# Patient Record
Sex: FEMALE
[De-identification: names, ages, dates, MRNs, and addresses within clinical notes are randomized per-mention and may not be internally consistent; named-entity substitution may affect disease eponyms.]

---

## 2017-11-20 ENCOUNTER — RESULT REVIEW (OUTPATIENT)
Age: 22
End: 2017-11-20

## 2017-12-06 ENCOUNTER — APPOINTMENT (OUTPATIENT)
Dept: ANTEPARTUM | Facility: CLINIC | Age: 22
End: 2017-12-06
Payer: MEDICAID

## 2017-12-06 ENCOUNTER — ASOB RESULT (OUTPATIENT)
Age: 22
End: 2017-12-06

## 2017-12-06 PROBLEM — Z00.00 ENCOUNTER FOR PREVENTIVE HEALTH EXAMINATION: Status: ACTIVE | Noted: 2017-12-06

## 2017-12-06 PROCEDURE — 76813 OB US NUCHAL MEAS 1 GEST: CPT

## 2017-12-06 PROCEDURE — 76801 OB US < 14 WKS SINGLE FETUS: CPT

## 2018-01-26 ENCOUNTER — APPOINTMENT (OUTPATIENT)
Dept: ANTEPARTUM | Facility: CLINIC | Age: 23
End: 2018-01-26

## 2018-02-07 ENCOUNTER — APPOINTMENT (OUTPATIENT)
Dept: ANTEPARTUM | Facility: CLINIC | Age: 23
End: 2018-02-07
Payer: MEDICAID

## 2018-02-07 ENCOUNTER — ASOB RESULT (OUTPATIENT)
Age: 23
End: 2018-02-07

## 2018-02-07 PROCEDURE — 76811 OB US DETAILED SNGL FETUS: CPT

## 2018-04-03 ENCOUNTER — ASOB RESULT (OUTPATIENT)
Age: 23
End: 2018-04-03

## 2018-04-03 ENCOUNTER — APPOINTMENT (OUTPATIENT)
Dept: ANTEPARTUM | Facility: CLINIC | Age: 23
End: 2018-04-03
Payer: MEDICAID

## 2018-04-03 PROCEDURE — 76816 OB US FOLLOW-UP PER FETUS: CPT

## 2018-05-31 ENCOUNTER — INPATIENT (INPATIENT)
Facility: HOSPITAL | Age: 23
LOS: 3 days | Discharge: HOME | End: 2018-06-04
Attending: OBSTETRICS & GYNECOLOGY | Admitting: OBSTETRICS & GYNECOLOGY

## 2018-05-31 VITALS — HEIGHT: 62 IN | WEIGHT: 134.48 LBS

## 2018-05-31 RX ORDER — CITRIC ACID/SODIUM CITRATE 300-500 MG
15 SOLUTION, ORAL ORAL EVERY 4 HOURS
Qty: 0 | Refills: 0 | Status: DISCONTINUED | OUTPATIENT
Start: 2018-05-31 | End: 2018-06-02

## 2018-05-31 RX ORDER — SODIUM CHLORIDE 9 MG/ML
500 INJECTION, SOLUTION INTRAVENOUS ONCE
Qty: 0 | Refills: 0 | Status: COMPLETED | OUTPATIENT
Start: 2018-05-31 | End: 2018-06-01

## 2018-05-31 RX ORDER — SODIUM CHLORIDE 9 MG/ML
1000 INJECTION, SOLUTION INTRAVENOUS
Qty: 0 | Refills: 0 | Status: DISCONTINUED | OUTPATIENT
Start: 2018-05-31 | End: 2018-06-02

## 2018-05-31 RX ORDER — OXYTOCIN 10 UNIT/ML
333.33 VIAL (ML) INJECTION
Qty: 20 | Refills: 0 | Status: COMPLETED | OUTPATIENT
Start: 2018-05-31

## 2018-05-31 NOTE — PATIENT PROFILE OB - CURRENT PREGNANCY COMPLICATIONS, OB PROFILE
Maternal Positive GBS/elevated liver enzymes cholystasis Maternal Positive GBS/Maternal Medical Condition/elevated liver enzymes cholystasis

## 2018-06-01 LAB
ABO RH CONFIRMATION: SIGNIFICANT CHANGE UP
ALBUMIN SERPL ELPH-MCNC: 2.4 G/DL — LOW (ref 3.3–5)
ALP SERPL-CCNC: 392 U/L — HIGH (ref 40–120)
ALT FLD-CCNC: 262 U/L — HIGH (ref 12–78)
APPEARANCE UR: CLEAR — SIGNIFICANT CHANGE UP
AST SERPL-CCNC: 126 U/L — HIGH (ref 15–37)
BACTERIA # UR AUTO: ABNORMAL
BASOPHILS # BLD AUTO: 0.04 K/UL — SIGNIFICANT CHANGE UP (ref 0–0.2)
BASOPHILS NFR BLD AUTO: 0.4 % — SIGNIFICANT CHANGE UP (ref 0–2)
BILIRUB DIRECT SERPL-MCNC: 0.2 MG/DL — SIGNIFICANT CHANGE UP (ref 0–0.2)
BILIRUB INDIRECT FLD-MCNC: 0.2 MG/DL — SIGNIFICANT CHANGE UP (ref 0.2–1)
BILIRUB SERPL-MCNC: 0.4 MG/DL — SIGNIFICANT CHANGE UP (ref 0.2–1.2)
BILIRUB UR-MCNC: NEGATIVE — SIGNIFICANT CHANGE UP
BLD GP AB SCN SERPL QL: SIGNIFICANT CHANGE UP
COLOR SPEC: YELLOW — SIGNIFICANT CHANGE UP
DIFF PNL FLD: NEGATIVE — SIGNIFICANT CHANGE UP
EOSINOPHIL # BLD AUTO: 0.1 K/UL — SIGNIFICANT CHANGE UP (ref 0–0.5)
EOSINOPHIL NFR BLD AUTO: 0.9 % — SIGNIFICANT CHANGE UP (ref 0–6)
EPI CELLS # UR: SIGNIFICANT CHANGE UP
GLUCOSE UR QL: NEGATIVE MG/DL — SIGNIFICANT CHANGE UP
HCT VFR BLD CALC: 34.5 % — SIGNIFICANT CHANGE UP (ref 34.5–45)
HGB BLD-MCNC: 11.6 G/DL — SIGNIFICANT CHANGE UP (ref 11.5–15.5)
IMM GRANULOCYTES NFR BLD AUTO: 1.9 % — HIGH (ref 0–1.5)
KETONES UR-MCNC: NEGATIVE — SIGNIFICANT CHANGE UP
LEUKOCYTE ESTERASE UR-ACNC: ABNORMAL
LYMPHOCYTES # BLD AUTO: 18.1 % — SIGNIFICANT CHANGE UP (ref 13–44)
LYMPHOCYTES # BLD AUTO: 2.03 K/UL — SIGNIFICANT CHANGE UP (ref 1–3.3)
MCHC RBC-ENTMCNC: 27.1 PG — SIGNIFICANT CHANGE UP (ref 27–34)
MCHC RBC-ENTMCNC: 33.6 GM/DL — SIGNIFICANT CHANGE UP (ref 32–36)
MCV RBC AUTO: 80.6 FL — SIGNIFICANT CHANGE UP (ref 80–100)
MONOCYTES # BLD AUTO: 0.67 K/UL — SIGNIFICANT CHANGE UP (ref 0–0.9)
MONOCYTES NFR BLD AUTO: 6 % — SIGNIFICANT CHANGE UP (ref 2–14)
NEUTROPHILS # BLD AUTO: 8.15 K/UL — HIGH (ref 1.8–7.4)
NEUTROPHILS NFR BLD AUTO: 72.7 % — SIGNIFICANT CHANGE UP (ref 43–77)
NITRITE UR-MCNC: NEGATIVE — SIGNIFICANT CHANGE UP
NRBC # BLD: 0 /100 WBCS — SIGNIFICANT CHANGE UP (ref 0–0)
PH UR: 7 — SIGNIFICANT CHANGE UP (ref 5–8)
PLATELET # BLD AUTO: 226 K/UL — SIGNIFICANT CHANGE UP (ref 150–400)
PROT SERPL-MCNC: 7.1 GM/DL — SIGNIFICANT CHANGE UP (ref 6–8.3)
PROT UR-MCNC: 15 MG/DL
RBC # BLD: 4.28 M/UL — SIGNIFICANT CHANGE UP (ref 3.8–5.2)
RBC # FLD: 14.5 % — SIGNIFICANT CHANGE UP (ref 10.3–14.5)
RBC CASTS # UR COMP ASSIST: ABNORMAL /HPF (ref 0–4)
SP GR SPEC: 1.01 — SIGNIFICANT CHANGE UP (ref 1.01–1.02)
T PALLIDUM AB TITR SER: NEGATIVE — SIGNIFICANT CHANGE UP
TYPE + AB SCN PNL BLD: SIGNIFICANT CHANGE UP
UROBILINOGEN FLD QL: NEGATIVE MG/DL — SIGNIFICANT CHANGE UP
WBC # BLD: 11.2 K/UL — HIGH (ref 3.8–10.5)
WBC # FLD AUTO: 11.2 K/UL — HIGH (ref 3.8–10.5)
WBC UR QL: ABNORMAL

## 2018-06-01 RX ORDER — AMPICILLIN TRIHYDRATE 250 MG
1 CAPSULE ORAL EVERY 4 HOURS
Qty: 0 | Refills: 0 | Status: DISCONTINUED | OUTPATIENT
Start: 2018-06-01 | End: 2018-06-02

## 2018-06-01 RX ORDER — OXYTOCIN 10 UNIT/ML
2 VIAL (ML) INJECTION
Qty: 30 | Refills: 0 | Status: DISCONTINUED | OUTPATIENT
Start: 2018-06-01 | End: 2018-06-04

## 2018-06-01 RX ORDER — AMPICILLIN TRIHYDRATE 250 MG
2 CAPSULE ORAL ONCE
Qty: 0 | Refills: 0 | Status: COMPLETED | OUTPATIENT
Start: 2018-06-01 | End: 2018-06-01

## 2018-06-01 RX ORDER — AMPICILLIN TRIHYDRATE 250 MG
CAPSULE ORAL
Qty: 0 | Refills: 0 | Status: DISCONTINUED | OUTPATIENT
Start: 2018-06-01 | End: 2018-06-02

## 2018-06-01 RX ORDER — BUTORPHANOL TARTRATE 2 MG/ML
2 INJECTION, SOLUTION INTRAMUSCULAR; INTRAVENOUS ONCE
Qty: 0 | Refills: 0 | Status: DISCONTINUED | OUTPATIENT
Start: 2018-06-01 | End: 2018-06-01

## 2018-06-01 RX ADMIN — Medication 108 GRAM(S): at 13:36

## 2018-06-01 RX ADMIN — BUTORPHANOL TARTRATE 2 MILLIGRAM(S): 2 INJECTION, SOLUTION INTRAMUSCULAR; INTRAVENOUS at 20:58

## 2018-06-01 RX ADMIN — SODIUM CHLORIDE 125 MILLILITER(S): 9 INJECTION, SOLUTION INTRAVENOUS at 06:48

## 2018-06-01 RX ADMIN — SODIUM CHLORIDE 1000 MILLILITER(S): 9 INJECTION, SOLUTION INTRAVENOUS at 23:50

## 2018-06-01 RX ADMIN — SODIUM CHLORIDE 125 MILLILITER(S): 9 INJECTION, SOLUTION INTRAVENOUS at 00:12

## 2018-06-01 RX ADMIN — Medication 116 GRAM(S): at 09:32

## 2018-06-01 RX ADMIN — Medication 108 GRAM(S): at 17:35

## 2018-06-01 RX ADMIN — Medication 2 MILLIUNIT(S)/MIN: at 15:50

## 2018-06-01 RX ADMIN — Medication 108 GRAM(S): at 21:15

## 2018-06-02 LAB
ALBUMIN SERPL ELPH-MCNC: 1.8 G/DL — LOW (ref 3.3–5)
ALP SERPL-CCNC: 303 U/L — HIGH (ref 40–120)
ALT FLD-CCNC: 258 U/L — HIGH (ref 12–78)
ANION GAP SERPL CALC-SCNC: 10 MMOL/L — SIGNIFICANT CHANGE UP (ref 5–17)
AST SERPL-CCNC: 147 U/L — HIGH (ref 15–37)
BASOPHILS # BLD AUTO: 0.07 K/UL — SIGNIFICANT CHANGE UP (ref 0–0.2)
BASOPHILS NFR BLD AUTO: 0.4 % — SIGNIFICANT CHANGE UP (ref 0–2)
BILIRUB SERPL-MCNC: 0.8 MG/DL — SIGNIFICANT CHANGE UP (ref 0.2–1.2)
BUN SERPL-MCNC: 5 MG/DL — LOW (ref 7–23)
CALCIUM SERPL-MCNC: 8 MG/DL — LOW (ref 8.5–10.1)
CHLORIDE SERPL-SCNC: 106 MMOL/L — SIGNIFICANT CHANGE UP (ref 96–108)
CO2 SERPL-SCNC: 23 MMOL/L — SIGNIFICANT CHANGE UP (ref 22–31)
CREAT SERPL-MCNC: 0.48 MG/DL — LOW (ref 0.5–1.3)
EOSINOPHIL # BLD AUTO: 0.09 K/UL — SIGNIFICANT CHANGE UP (ref 0–0.5)
EOSINOPHIL NFR BLD AUTO: 0.5 % — SIGNIFICANT CHANGE UP (ref 0–6)
GLUCOSE SERPL-MCNC: 85 MG/DL — SIGNIFICANT CHANGE UP (ref 70–99)
HCT VFR BLD CALC: 28.6 % — LOW (ref 34.5–45)
HGB BLD-MCNC: 9.6 G/DL — LOW (ref 11.5–15.5)
IMM GRANULOCYTES NFR BLD AUTO: 1.1 % — SIGNIFICANT CHANGE UP (ref 0–1.5)
LYMPHOCYTES # BLD AUTO: 16.7 % — SIGNIFICANT CHANGE UP (ref 13–44)
LYMPHOCYTES # BLD AUTO: 2.95 K/UL — SIGNIFICANT CHANGE UP (ref 1–3.3)
MCHC RBC-ENTMCNC: 27.4 PG — SIGNIFICANT CHANGE UP (ref 27–34)
MCHC RBC-ENTMCNC: 33.6 GM/DL — SIGNIFICANT CHANGE UP (ref 32–36)
MCV RBC AUTO: 81.5 FL — SIGNIFICANT CHANGE UP (ref 80–100)
MONOCYTES # BLD AUTO: 0.89 K/UL — SIGNIFICANT CHANGE UP (ref 0–0.9)
MONOCYTES NFR BLD AUTO: 5 % — SIGNIFICANT CHANGE UP (ref 2–14)
NEUTROPHILS # BLD AUTO: 13.45 K/UL — HIGH (ref 1.8–7.4)
NEUTROPHILS NFR BLD AUTO: 76.3 % — SIGNIFICANT CHANGE UP (ref 43–77)
NRBC # BLD: 0 /100 WBCS — SIGNIFICANT CHANGE UP (ref 0–0)
PLATELET # BLD AUTO: 206 K/UL — SIGNIFICANT CHANGE UP (ref 150–400)
POTASSIUM SERPL-MCNC: 3.5 MMOL/L — SIGNIFICANT CHANGE UP (ref 3.5–5.3)
POTASSIUM SERPL-SCNC: 3.5 MMOL/L — SIGNIFICANT CHANGE UP (ref 3.5–5.3)
PROT SERPL-MCNC: 5.5 GM/DL — LOW (ref 6–8.3)
RBC # BLD: 3.51 M/UL — LOW (ref 3.8–5.2)
RBC # FLD: 14.8 % — HIGH (ref 10.3–14.5)
SODIUM SERPL-SCNC: 139 MMOL/L — SIGNIFICANT CHANGE UP (ref 135–145)
WBC # BLD: 17.64 K/UL — HIGH (ref 3.8–10.5)
WBC # FLD AUTO: 17.64 K/UL — HIGH (ref 3.8–10.5)

## 2018-06-02 RX ORDER — DIPHENHYDRAMINE HCL 50 MG
25 CAPSULE ORAL EVERY 6 HOURS
Qty: 0 | Refills: 0 | Status: DISCONTINUED | OUTPATIENT
Start: 2018-06-02 | End: 2018-06-04

## 2018-06-02 RX ORDER — DIBUCAINE 1 %
1 OINTMENT (GRAM) RECTAL EVERY 4 HOURS
Qty: 0 | Refills: 0 | Status: DISCONTINUED | OUTPATIENT
Start: 2018-06-02 | End: 2018-06-04

## 2018-06-02 RX ORDER — HYDROCORTISONE 1 %
1 OINTMENT (GRAM) TOPICAL EVERY 4 HOURS
Qty: 0 | Refills: 0 | Status: DISCONTINUED | OUTPATIENT
Start: 2018-06-02 | End: 2018-06-04

## 2018-06-02 RX ORDER — GLYCERIN ADULT
1 SUPPOSITORY, RECTAL RECTAL AT BEDTIME
Qty: 0 | Refills: 0 | Status: DISCONTINUED | OUTPATIENT
Start: 2018-06-02 | End: 2018-06-04

## 2018-06-02 RX ORDER — OXYTOCIN 10 UNIT/ML
41.67 VIAL (ML) INJECTION
Qty: 20 | Refills: 0 | Status: DISCONTINUED | OUTPATIENT
Start: 2018-06-02 | End: 2018-06-04

## 2018-06-02 RX ORDER — OXYCODONE AND ACETAMINOPHEN 5; 325 MG/1; MG/1
2 TABLET ORAL EVERY 6 HOURS
Qty: 0 | Refills: 0 | Status: DISCONTINUED | OUTPATIENT
Start: 2018-06-02 | End: 2018-06-04

## 2018-06-02 RX ORDER — AER TRAVELER 0.5 G/1
1 SOLUTION RECTAL; TOPICAL EVERY 4 HOURS
Qty: 0 | Refills: 0 | Status: DISCONTINUED | OUTPATIENT
Start: 2018-06-02 | End: 2018-06-04

## 2018-06-02 RX ORDER — SIMETHICONE 80 MG/1
80 TABLET, CHEWABLE ORAL EVERY 6 HOURS
Qty: 0 | Refills: 0 | Status: DISCONTINUED | OUTPATIENT
Start: 2018-06-02 | End: 2018-06-04

## 2018-06-02 RX ORDER — OXYTOCIN 10 UNIT/ML
333.33 VIAL (ML) INJECTION
Qty: 20 | Refills: 0 | Status: COMPLETED | OUTPATIENT
Start: 2018-06-02 | End: 2018-06-02

## 2018-06-02 RX ORDER — SODIUM CHLORIDE 9 MG/ML
3 INJECTION INTRAMUSCULAR; INTRAVENOUS; SUBCUTANEOUS EVERY 8 HOURS
Qty: 0 | Refills: 0 | Status: DISCONTINUED | OUTPATIENT
Start: 2018-06-02 | End: 2018-06-02

## 2018-06-02 RX ORDER — IBUPROFEN 200 MG
600 TABLET ORAL EVERY 6 HOURS
Qty: 0 | Refills: 0 | Status: DISCONTINUED | OUTPATIENT
Start: 2018-06-02 | End: 2018-06-04

## 2018-06-02 RX ORDER — MAGNESIUM HYDROXIDE 400 MG/1
30 TABLET, CHEWABLE ORAL
Qty: 0 | Refills: 0 | Status: DISCONTINUED | OUTPATIENT
Start: 2018-06-02 | End: 2018-06-04

## 2018-06-02 RX ORDER — ACETAMINOPHEN 500 MG
650 TABLET ORAL EVERY 6 HOURS
Qty: 0 | Refills: 0 | Status: DISCONTINUED | OUTPATIENT
Start: 2018-06-02 | End: 2018-06-04

## 2018-06-02 RX ORDER — PRAMOXINE HYDROCHLORIDE 150 MG/15G
1 AEROSOL, FOAM RECTAL EVERY 4 HOURS
Qty: 0 | Refills: 0 | Status: DISCONTINUED | OUTPATIENT
Start: 2018-06-02 | End: 2018-06-04

## 2018-06-02 RX ORDER — DOCUSATE SODIUM 100 MG
100 CAPSULE ORAL
Qty: 0 | Refills: 0 | Status: DISCONTINUED | OUTPATIENT
Start: 2018-06-02 | End: 2018-06-04

## 2018-06-02 RX ORDER — LANOLIN
1 OINTMENT (GRAM) TOPICAL EVERY 6 HOURS
Qty: 0 | Refills: 0 | Status: DISCONTINUED | OUTPATIENT
Start: 2018-06-02 | End: 2018-06-04

## 2018-06-02 RX ORDER — TETANUS TOXOID, REDUCED DIPHTHERIA TOXOID AND ACELLULAR PERTUSSIS VACCINE, ADSORBED 5; 2.5; 8; 8; 2.5 [IU]/.5ML; [IU]/.5ML; UG/.5ML; UG/.5ML; UG/.5ML
0.5 SUSPENSION INTRAMUSCULAR ONCE
Qty: 0 | Refills: 0 | Status: DISCONTINUED | OUTPATIENT
Start: 2018-06-02 | End: 2018-06-04

## 2018-06-02 RX ADMIN — Medication 100 MILLIGRAM(S): at 07:46

## 2018-06-02 RX ADMIN — Medication 600 MILLIGRAM(S): at 18:43

## 2018-06-02 RX ADMIN — Medication 1 APPLICATION(S): at 20:32

## 2018-06-02 RX ADMIN — Medication 600 MILLIGRAM(S): at 06:51

## 2018-06-02 RX ADMIN — Medication 108 GRAM(S): at 01:30

## 2018-06-02 RX ADMIN — OXYCODONE AND ACETAMINOPHEN 1 TABLET(S): 5; 325 TABLET ORAL at 07:46

## 2018-06-02 RX ADMIN — Medication 600 MILLIGRAM(S): at 19:30

## 2018-06-02 RX ADMIN — OXYCODONE AND ACETAMINOPHEN 2 TABLET(S): 5; 325 TABLET ORAL at 08:44

## 2018-06-02 RX ADMIN — Medication 1000 MILLIUNIT(S)/MIN: at 04:59

## 2018-06-02 RX ADMIN — Medication 1 TABLET(S): at 13:01

## 2018-06-02 RX ADMIN — Medication 600 MILLIGRAM(S): at 07:21

## 2018-06-02 NOTE — PROGRESS NOTE ADULT - SUBJECTIVE AND OBJECTIVE BOX
Progress Note  PPD # 0  Pt without complaints  Vital Signs Last 24 Hrs  T(C): 37.1 (2018 07:50), Max: 37.7 (2018 05:51)  T(F): 98.7 (2018 07:50), Max: 99.8 (2018 05:51)  HR: 74 (2018 07:50) (74 - 109)  BP: 103/52 (2018 07:50) (100/55 - 114/68)  BP(mean): --  RR: 16 (2018 07:50) (15 - 18)  SpO2: 98% (2018 07:50) (98% - 98%)  abdomen soft, utx firm/NT   light blood  labs pending  A/P s/p  doing well. Check labs. Follow per routine.

## 2018-06-03 LAB
ALBUMIN SERPL ELPH-MCNC: 2.2 G/DL — LOW (ref 3.3–5)
ALP SERPL-CCNC: 272 U/L — HIGH (ref 40–120)
ALT FLD-CCNC: 236 U/L — HIGH (ref 12–78)
AST SERPL-CCNC: 122 U/L — HIGH (ref 15–37)
BILIRUB DIRECT SERPL-MCNC: 0.1 MG/DL — SIGNIFICANT CHANGE UP (ref 0–0.2)
BILIRUB INDIRECT FLD-MCNC: 0.3 MG/DL — SIGNIFICANT CHANGE UP (ref 0.2–1)
BILIRUB SERPL-MCNC: 0.4 MG/DL — SIGNIFICANT CHANGE UP (ref 0.2–1.2)
PROT SERPL-MCNC: 6 GM/DL — SIGNIFICANT CHANGE UP (ref 6–8.3)

## 2018-06-03 RX ADMIN — Medication 600 MILLIGRAM(S): at 01:10

## 2018-06-03 RX ADMIN — Medication 100 MILLIGRAM(S): at 08:48

## 2018-06-03 RX ADMIN — Medication 600 MILLIGRAM(S): at 16:55

## 2018-06-03 RX ADMIN — Medication 1 TABLET(S): at 11:57

## 2018-06-03 RX ADMIN — Medication 600 MILLIGRAM(S): at 02:00

## 2018-06-04 ENCOUNTER — TRANSCRIPTION ENCOUNTER (OUTPATIENT)
Age: 23
End: 2018-06-04

## 2018-06-04 VITALS
HEART RATE: 92 BPM | DIASTOLIC BLOOD PRESSURE: 83 MMHG | SYSTOLIC BLOOD PRESSURE: 115 MMHG | TEMPERATURE: 98 F | OXYGEN SATURATION: 99 % | RESPIRATION RATE: 16 BRPM

## 2018-06-04 RX ORDER — DOCUSATE SODIUM 100 MG
1 CAPSULE ORAL
Qty: 0 | Refills: 0 | DISCHARGE
Start: 2018-06-04

## 2018-06-04 RX ORDER — FERROUS SULFATE 325(65) MG
1 TABLET ORAL
Qty: 30 | Refills: 0
Start: 2018-06-04 | End: 2018-07-03

## 2018-06-04 RX ORDER — DOCUSATE SODIUM 100 MG
1 CAPSULE ORAL
Qty: 30 | Refills: 1
Start: 2018-06-04 | End: 2018-08-02

## 2018-06-04 RX ORDER — IBUPROFEN 200 MG
1 TABLET ORAL
Qty: 30 | Refills: 0
Start: 2018-06-04

## 2018-06-04 RX ADMIN — Medication 600 MILLIGRAM(S): at 06:45

## 2018-06-04 RX ADMIN — Medication 600 MILLIGRAM(S): at 08:21

## 2018-06-04 RX ADMIN — Medication 1 TABLET(S): at 12:01

## 2018-06-04 RX ADMIN — Medication 100 MILLIGRAM(S): at 12:01

## 2018-06-04 NOTE — DISCHARGE NOTE OB - CARE PLAN
Principal Discharge DX:	Vaginal delivery  Goal:	successful transition to postpartum period  Assessment and plan of treatment:	22y.o.  s/p  on 18  2nd degree laceration  Rh positive  Rubella Immune  No s/s of PPD  Performing ADLs without difficulty  - discharge to home today  - PP warning signs rv'd along with s/s of PPD  - continue PNVs daily  - F/U with Jose Luis in 4-6wks  Secondary Diagnosis:	Anemia due to other cause  Goal:	return to normal Hematocrit and Hemoglobin  Assessment and plan of treatment:	Hct 28.6, Hgb 9.6  tolerating ambulation without difficulty  - begin iron supplements daily  - Rx'd stool softener while taking iron  - increase dietary iron

## 2018-06-04 NOTE — DISCHARGE NOTE OB - PLAN OF CARE
successful transition to postpartum period 22y.o.  s/p  on 18  2nd degree laceration  Rh positive  Rubella Immune  No s/s of PPD  Performing ADLs without difficulty  - discharge to home today  - PP warning signs rv'd along with s/s of PPD  - continue PNVs daily  - F/U with Jose Luis in 4-6wks return to normal Hematocrit and Hemoglobin Hct 28.6, Hgb 9.6  tolerating ambulation without difficulty  - begin iron supplements daily  - Rx'd stool softener while taking iron  - increase dietary iron

## 2018-06-04 NOTE — DISCHARGE NOTE OB - AVOID PROLONGED STANDING
Upon Nutritional Assessment by the Registered Dietitian your patient was determined to meet criteria / has evidence of the following diagnosis/diagnoses:          [ ]  Mild Protein Calorie Malnutrition        [X]  Moderate Protein Calorie Malnutrition        [ ] Severe Protein Calorie Malnutrition        [ ] Unspecified Protein Calorie Malnutrition        [ ] Underweight / BMI <19        [ ] Morbid Obesity / BMI > 40      Findings as based on:  •  Comprehensive nutrition assessment and consultation  •  Calorie counts (nutrient intake analysis)  •  Food acceptance and intake status from observations by staff  •  Follow up  •  Patient education  •  Intervention secondary to interdisciplinary rounds  •   concerns      Treatment:    The following diet has been recommended:  Low sodium , Ensure Enlive 2 x day=750 calories, 40 grams protein (ordered as recommended 09-29)  Recommend multivitamin c minerals daily      PROVIDER Section:     By signing this assessment you are acknowledging and agree with the diagnosis/diagnoses assigned by the Registered Dietitian    Comments: Statement Selected

## 2018-06-04 NOTE — DISCHARGE NOTE OB - HOSPITAL COURSE
Pt admitted for IOL due to ICP w/elevated liver enzymes and pending bile acids.  Proceeded for  of viable baby boy on .  2nd degree perineal laceration repaired.  Pt transitioning well to postpartum period.  Ambulating and tolerating po without difficulty.  Breastfeeding without concern at this time.  Experienced acute blood loss anemia.  Will Rx iron supplements and stool softener with discharge.  Follow up 4-6wks postpartum @[facility].

## 2018-06-04 NOTE — DISCHARGE NOTE OB - ADDITIONAL INSTRUCTIONS
- discharge to home today  - Call Jose Luis for vaginal bleeding soaking a pad in less than 1hr continuously, temp >100.4, pain/redness in calf, lumps w/redness in breasts, any signs of symptoms of postpartum depression  - continue PNVs daily  - Begin iron supplements with stool softener daily  - F/U 4-6wks with Jose Luis for PP visit  - F/U liver enzymes 6-8wks postpartum

## 2018-06-04 NOTE — DISCHARGE NOTE OB - CARE PROVIDER_API CALL
Ely Lynn), Obstetrics and Gynecology  [address] [place] [address]  Phone: [phone]  Fax: [phone]

## 2018-06-04 NOTE — DISCHARGE NOTE OB - MEDICATION SUMMARY - MEDICATIONS TO TAKE
I will START or STAY ON the medications listed below when I get home from the hospital:     mg oral tablet  -- 1 tab(s) by mouth every 6 hours, As Needed -Moderate pain or cramping - for moderate pain   -- Indication: For vaginal delivery    Prenatal Multivitamins with Folic Acid 1 mg oral tablet  -- 1 tab(s) by mouth once a day  -- Indication: For vaginal delivery    FeroSul 325 mg (65 mg elemental iron) oral tablet  -- 1 tab(s) by mouth once a day   -- Check with your doctor before becoming pregnant.  Do not chew, break, or crush.  May discolor urine or feces.    -- Indication: For anemia    Colace 100 mg oral capsule  -- 1 cap(s) by mouth once a day   -- Medication should be taken with plenty of water.    -- Indication: For vaginal delivery    docusate sodium 100 mg oral capsule  -- 1 cap(s) by mouth 2 times a day, As needed, Stool Softening  -- Indication: For vaginal delivery

## 2018-06-04 NOTE — DISCHARGE NOTE OB - PATIENT PORTAL LINK FT
You can access the [facility] Patient Portal, offered by [facility], by registering with the following website: [email or web address]

## 2018-06-07 DIAGNOSIS — Z3A.38 38 WEEKS GESTATION OF PREGNANCY: ICD-10-CM

## 2018-06-07 DIAGNOSIS — D62 ACUTE POSTHEMORRHAGIC ANEMIA: ICD-10-CM

## 2018-06-07 DIAGNOSIS — K83.1 OBSTRUCTION OF BILE DUCT: ICD-10-CM

## 2018-11-21 ENCOUNTER — RESULT REVIEW (OUTPATIENT)
Age: 23
End: 2018-11-21

## 2019-04-08 NOTE — DISCHARGE NOTE OB - PHYSICIAN SECTION COMPLETE
-Patient with INR of 1.5. Unclear etiology but noted to have liver dysfxn. Low suspicion for malnutrition  - no overt bleeding Yes

## 2019-04-12 ENCOUNTER — APPOINTMENT (OUTPATIENT)
Dept: ULTRASOUND IMAGING | Facility: CLINIC | Age: 24
End: 2019-04-12

## 2019-04-12 ENCOUNTER — OUTPATIENT (OUTPATIENT)
Dept: OUTPATIENT SERVICES | Facility: HOSPITAL | Age: 24
LOS: 1 days | End: 2019-04-12
Payer: MEDICAID

## 2019-04-12 DIAGNOSIS — Z00.8 ENCOUNTER FOR OTHER GENERAL EXAMINATION: ICD-10-CM

## 2019-04-12 PROCEDURE — 76641 ULTRASOUND BREAST COMPLETE: CPT

## 2019-04-12 PROCEDURE — 76641 ULTRASOUND BREAST COMPLETE: CPT | Mod: 26,50

## 2021-08-27 NOTE — DISCHARGE NOTE OB - TEXT 10
The patient is a 29y Female complaining of shortness of breath. limit trips up and down stairs- no carrying baby if possible

## 2021-09-10 ENCOUNTER — APPOINTMENT (OUTPATIENT)
Age: 26
End: 2021-09-10
Payer: COMMERCIAL

## 2021-09-10 PROCEDURE — 0001A: CPT

## 2021-10-01 ENCOUNTER — APPOINTMENT (OUTPATIENT)
Age: 26
End: 2021-10-01
Payer: COMMERCIAL

## 2021-10-01 PROCEDURE — 0002A: CPT

## 2021-11-22 NOTE — PROGRESS NOTE ADULT - SUBJECTIVE AND OBJECTIVE BOX
PPD#1    Pt Seen and evaluated at bedside. + flatus, no BM, pain is well controlled. No PO yet, ordering breakfast this am. Minimal lochia  Denies headache, dizziness, chest pain, palpitations, shortness of breath, nausea, vomiting, heavy vaginal bleeding or perineal discomfort. Pt is breastfeeding, ambulating without assistance, voiding spontaneously, and tolerating diet.     Plan for the day discussed, understanding assessed via teach back method, all questions answered.    REVIEW OF SYSTEMS:  CONSTITUTIONAL: No weakness, fevers or chills  EYES/ENT: No visual changes;  No vertigo or throat pain   NECK: No pain or stiffness  RESPIRATORY: No cough, wheezing, hemoptysis; No shortness of breath  CARDIOVASCULAR: No chest pain or palpitations  GASTROINTESTINAL: +abdominal pain in the setting of . No nausea, vomiting, or hematemesis; No diarrhea or constipation. No melena or hematochezia.  GENITOURINARY: No dysuria, frequency or hematuria  NEUROLOGICAL: No numbness or weakness  SKIN: No itching, burning, rashes, or lesions   All other review of systems is negative unless indicated above    OBJECTIVE  Vital Signs Last 24 Hrs  T(C): 36.7 (2018 07:23), Max: 37.1 (2018 07:50)  T(F): 98 (2018 07:23), Max: 98.7 (2018 07:50)  HR: 70 (2018 07:23) (70 - 93)  BP: 93/58 (2018 07:23) (88/50 - 103/52)  RR: 16 (2018 07:23) (16 - 16)  SpO2: 100% (2018 07:23) (98% - 100%)  I&O's Summary    2018 07:01  -  2018 07:00  --------------------------------------------------------  IN: 0 mL / OUT: 900 mL / NET: -900 mL        PHYSICAL EXAM:  Constitutional: NAD, awake and alert, well-developed  HEENT: PERR, EOMI, Normal Hearing, MMM  Neck: Soft and supple, No LAD, No JVD  Respiratory: Breath sounds are clear bilaterally, No wheezing, rales or rhonchi  Cardiovascular: S1 and S2, regular rate and rhythm, no Murmurs, gallops or rubs  Abdomen: soft, appropriately  tender, firm fundus palpated below umbilicus, nondistended, no rebound or guarding.   Extremities: No peripheral edema  Vascular: 2+ peripheral pulses  Neurological: A/O x 3, no focal deficits  Musculoskeletal: 5/5 strength b/l upper and lower extremities  Skin: No rashes    LABS: all labs reviewed    Rubella Immune  HIV AB nonreactive  RPR negative  HepBsurface antigen negative     @ 23:49  Type + Screen: O POS               9.6    17.64 )-----------( 206      ( 2018 17:41 )             28.6                         11.6   11.20 )-----------( 226      ( 31 May 2018 23:49 )             34.5     06-    139  |  106  |  5<L>  ----------------------------<  85  3.5   |  23  |  0.48<L>    Ca    8.0<L>      2018 06:46    TPro  5.5<L>  /  Alb  1.8<L>  /  TBili  0.8  /  DBili  x   /  AST  147<H>  /  ALT  258<H>  /  AlkPhos  303<H>            ASSESSMENT    PPD# s/p /vacum assisted VD. Pt is stable, vitals and labs are stable. Pain is well controlled.    PLAN  - Routine postpartum care  - OOB and in chair today  - Encourage ambulation and po fluids  - Encourage breastfeeding  - D/c on PPD#2 if stable PPD#1 s/p , now     Pt Seen and evaluated at bedside. + flatus, +BM, pain is well controlled.  Denies headache, dizziness, chest pain, palpitations, shortness of breath, nausea, vomiting, heavy vaginal bleeding or perineal discomfort. Pt is breastfeeding, ambulating without assistance, voiding spontaneously, and tolerating diet. Requests circumcision.     Plan for the day discussed, understanding assessed via teach back method, all questions answered.    REVIEW OF SYSTEMS:  CONSTITUTIONAL: No weakness, fevers or chills  EYES/ENT: No visual changes;  No vertigo or throat pain   NECK: No pain or stiffness  RESPIRATORY: No cough, wheezing, hemoptysis; No shortness of breath  CARDIOVASCULAR: No chest pain or palpitations  GASTROINTESTINAL: +abdominal pain in the setting of . No nausea, vomiting, or hematemesis; No diarrhea or constipation. No melena or hematochezia.  GENITOURINARY: No dysuria, frequency or hematuria  NEUROLOGICAL: No numbness or weakness  SKIN: No itching, burning, rashes, or lesions   All other review of systems is negative unless indicated above    OBJECTIVE  Vital Signs Last 24 Hrs  T(C): 36.7 (2018 07:23), Max: 37.1 (2018 07:50)  T(F): 98 (2018 07:23), Max: 98.7 (2018 07:50)  HR: 70 (2018 07:23) (70 - 93)  BP: 93/58 (2018 07:23) (88/50 - 103/52)  RR: 16 (2018 07:23) (16 - 16)  SpO2: 100% (2018 07:23) (98% - 100%)  I&O's Summary    2018 07:01  -  2018 07:00  --------------------------------------------------------  IN: 0 mL / OUT: 900 mL / NET: -900 mL        PHYSICAL EXAM:  Constitutional: NAD, awake and alert, well-developed  HEENT: EOMI, Normal Hearing, MMM  Neck: Soft and supple  Respiratory: Breath sounds are clear bilaterally, No wheezing, rales or rhonchi  Cardiovascular: S1 and S2, regular rate and rhythm  Abdomen: soft, appropriately  tender, firm fundus palpated below umbilicus, nondistended, no rebound or guarding.   Extremities: No peripheral edema  Vascular: 2+ peripheral pulses  Neurological: A/O x 3  Skin: No rashes    LABS: all labs reviewed    Rubella Immune  HIV AB nonreactive  RPR negative  HepBsurface antigen negative     @ 23:49  Type + Screen: O POS               9.6    17.64 )-----------( 206      ( 2018 17:41 )             28.6                         11.6   11.20 )-----------( 226      ( 31 May 2018 23:49 )             34.5     06-    139  |  106  |  5<L>  ----------------------------<  85  3.5   |  23  |  0.48<L>    Ca    8.0<L>      2018 06:46    TPro  5.5<L>  /  Alb  1.8<L>  /  TBili  0.8  /  DBili  x   /  AST  147<H>  /  ALT  258<H>  /  AlkPhos  303<H>            ASSESSMENT   PPD#1 s/p , now . Pt is stable, vitals and labs are stable. Pain is well controlled.    PLAN  - Routine postpartum care  - OOB and in chair today  - Encouraged ambulation and po fluids  - Encouraged breastfeeding  - D/c on PPD#2 if stable Patient

## 2022-09-12 ENCOUNTER — RESULT REVIEW (OUTPATIENT)
Age: 27
End: 2022-09-12

## 2022-10-12 ENCOUNTER — APPOINTMENT (OUTPATIENT)
Dept: ANTEPARTUM | Facility: CLINIC | Age: 27
End: 2022-10-12

## 2022-10-12 ENCOUNTER — ASOB RESULT (OUTPATIENT)
Age: 27
End: 2022-10-12

## 2022-10-12 PROCEDURE — 76813 OB US NUCHAL MEAS 1 GEST: CPT

## 2022-10-12 PROCEDURE — 36415 COLL VENOUS BLD VENIPUNCTURE: CPT

## 2022-10-17 LAB
ADDITIONAL US: NORMAL
CRL SCAN TWIN B: NORMAL
CRL SCAN: NORMAL
CROWN RUMP LENGTH TWIN B: NORMAL
CROWN RUMP LENGTH: 52.8 MM
DIA MOM: 1.21
DIA VALUE: 345.8 PG/ML
DOWN SYNDROME AGE RISK: NORMAL
DOWN SYNDROME INTERPRETATION: NORMAL
DOWN SYNDROME SCREENING RISK: NORMAL
FIRST TRIMESTER SCREEN COMMENTS: NORMAL
FIRST TRIMESTER SCREEN NOTE: NORMAL
FIRST TRIMESTER SCREEN RESULTS: NORMAL
FIRST TRIMESTER SCREEN TEST RESULTS: NORMAL
GEST. AGE ON COLLECTION DATE: 11.7 WEEKS
HCG MOM: 1.17
HCG VALUE: 141.8 IU/ML
MATERNAL AGE AT EDD: 27.8 YR
NT MOM TWIN B: NORMAL
NT TWIN B: NORMAL
NUCHAL TRANSLUCENCY (NT): 1.1 MM
NUCHAL TRANSLUCENCY MOM: 0.86
NUMBER OF FETUSES: 1
PAPP-A MOM: 0.97
PAPP-A VALUE: 832.3 NG/ML
RACE: NORMAL
SONOGRAPHER ID#: NORMAL
TRISOMY 18 AGE RISK: NORMAL
TRISOMY 18 INTERPRETATION: NORMAL
TRISOMY 18 SCREENING RISK: NORMAL
WEIGHT AFP: 127 LBS

## 2022-11-16 ENCOUNTER — APPOINTMENT (OUTPATIENT)
Dept: ANTEPARTUM | Facility: CLINIC | Age: 27
End: 2022-11-16

## 2022-11-16 PROCEDURE — 36415 COLL VENOUS BLD VENIPUNCTURE: CPT

## 2022-11-21 LAB
ADDITIONAL US: NORMAL
AFP MOM: 1.15
AFP VALUE: 45.7 NG/ML
COLLECTED ON 2: NORMAL
COLLECTED ON: NORMAL
CRL SCAN TWIN B: NORMAL
CRL SCAN: NORMAL
CROWN RUMP LENGTH TWIN B: NORMAL
CROWN RUMP LENGTH: 52.8 MM
DIA MOM: 0.91
DIA VALUE: 156.5 PG/ML
DOWN SYNDROME AGE RISK: NORMAL
DOWN SYNDROME INTERPRETATION: NORMAL
DOWN SYNDROME SCREENING RISK: NORMAL
FIRST TRIMESTER SAMPLE: NORMAL
GEST. AGE ON COLLECTION DATE: 11.7 WEEKS
GESTATIONAL AGE: 16.7 WEEKS
HCG MOM: 1.33
HCG VALUE: 48 IU/ML
INSULIN DEP DIABETES: NO
MATERNAL AGE AT EDD: 27.8 YR
NT MOM TWIN B: NORMAL
NT TWIN B: NORMAL
NUCHAL TRANSLUCENCY (NT): 1.1 MM
NUCHAL TRANSLUCENCY MOM: 0.86
NUMBER OF FETUSES: 1
OPEN SPINA BIFIDA: NORMAL
OSB INTERPRETATION: NORMAL
PAPP-A MOM: 0.97
PAPP-A VALUE: 832.3 NG/ML
RACE: NORMAL
SECOND TRIMESTER SAMPLE: NORMAL
SEQUENTIAL 2 COMMENTS: NORMAL
SEQUENTIAL 2 NOTE: NORMAL
SEQUENTIAL 2 RESULTS: NORMAL
SEQUENTIAL 2 TEST RESULTS: NORMAL
SONOGRAPHER ID#: NORMAL
TRISOMY 18 AGE RISK: NORMAL
TRISOMY 18 INTERPRETATION: NORMAL
TRISOMY 18 SCREENING RISK: NORMAL
UE3 MOM: 0.97
UE3 VALUE: 1.11 NG/ML
WEIGHT AFP: 127 LBS
WEIGHT: 129 LBS

## 2022-12-09 ENCOUNTER — APPOINTMENT (OUTPATIENT)
Dept: ANTEPARTUM | Facility: CLINIC | Age: 27
End: 2022-12-09

## 2022-12-09 ENCOUNTER — ASOB RESULT (OUTPATIENT)
Age: 27
End: 2022-12-09

## 2022-12-09 PROCEDURE — 76805 OB US >/= 14 WKS SNGL FETUS: CPT

## 2023-03-03 ENCOUNTER — ASOB RESULT (OUTPATIENT)
Age: 28
End: 2023-03-03

## 2023-03-03 ENCOUNTER — APPOINTMENT (OUTPATIENT)
Dept: ANTEPARTUM | Facility: CLINIC | Age: 28
End: 2023-03-03
Payer: MEDICAID

## 2023-03-03 PROCEDURE — 76816 OB US FOLLOW-UP PER FETUS: CPT

## 2023-03-03 PROCEDURE — 76819 FETAL BIOPHYS PROFIL W/O NST: CPT

## 2023-03-24 ENCOUNTER — NON-APPOINTMENT (OUTPATIENT)
Age: 28
End: 2023-03-24

## 2023-03-28 ENCOUNTER — APPOINTMENT (OUTPATIENT)
Dept: MATERNAL FETAL MEDICINE | Facility: CLINIC | Age: 28
End: 2023-03-28
Payer: MEDICAID

## 2023-03-28 ENCOUNTER — APPOINTMENT (OUTPATIENT)
Dept: ANTEPARTUM | Facility: CLINIC | Age: 28
End: 2023-03-28
Payer: MEDICAID

## 2023-03-28 ENCOUNTER — INPATIENT (INPATIENT)
Facility: HOSPITAL | Age: 28
LOS: 3 days | Discharge: HOME | DRG: 560 | End: 2023-04-01
Attending: OBSTETRICS & GYNECOLOGY | Admitting: OBSTETRICS & GYNECOLOGY
Payer: MEDICAID

## 2023-03-28 ENCOUNTER — ASOB RESULT (OUTPATIENT)
Age: 28
End: 2023-03-28

## 2023-03-28 ENCOUNTER — NON-APPOINTMENT (OUTPATIENT)
Age: 28
End: 2023-03-28

## 2023-03-28 VITALS
HEIGHT: 63 IN | WEIGHT: 141 LBS | OXYGEN SATURATION: 98 % | SYSTOLIC BLOOD PRESSURE: 98 MMHG | DIASTOLIC BLOOD PRESSURE: 58 MMHG | HEART RATE: 78 BPM | RESPIRATION RATE: 18 BRPM | BODY MASS INDEX: 24.98 KG/M2

## 2023-03-28 VITALS — HEIGHT: 63 IN

## 2023-03-28 DIAGNOSIS — K83.1 LIVER AND BILIARY TRACT DISORDERS IN PREGNANCY, THIRD TRIMESTER: ICD-10-CM

## 2023-03-28 DIAGNOSIS — Z87.59 PERSONAL HISTORY OF OTHER COMPLICATIONS OF PREGNANCY, CHILDBIRTH AND THE PUERPERIUM: ICD-10-CM

## 2023-03-28 DIAGNOSIS — Z3A.35 35 WEEKS GESTATION OF PREGNANCY: ICD-10-CM

## 2023-03-28 DIAGNOSIS — Z3A.00 WEEKS OF GESTATION OF PREGNANCY NOT SPECIFIED: ICD-10-CM

## 2023-03-28 DIAGNOSIS — Z34.90 ENCOUNTER FOR SUPERVISION OF NORMAL PREGNANCY, UNSPECIFIED, UNSPECIFIED TRIMESTER: ICD-10-CM

## 2023-03-28 DIAGNOSIS — Z29.9 ENCOUNTER FOR PROPHYLACTIC MEASURES, UNSPECIFIED: ICD-10-CM

## 2023-03-28 DIAGNOSIS — O26.619 LIVER AND BILIARY TRACT DISORDERS IN PREGNANCY, UNSPECIFIED TRIMESTER: ICD-10-CM

## 2023-03-28 DIAGNOSIS — O26.899 OTHER SPECIFIED PREGNANCY RELATED CONDITIONS, UNSPECIFIED TRIMESTER: ICD-10-CM

## 2023-03-28 DIAGNOSIS — R74.01 ELEVATION OF LEVELS OF LIVER TRANSAMINASE LEVELS: ICD-10-CM

## 2023-03-28 DIAGNOSIS — K83.1 LIVER AND BILIARY TRACT DISORDERS IN PREGNANCY, UNSPECIFIED TRIMESTER: ICD-10-CM

## 2023-03-28 DIAGNOSIS — O26.613 LIVER AND BILIARY TRACT DISORDERS IN PREGNANCY, THIRD TRIMESTER: ICD-10-CM

## 2023-03-28 LAB
ALBUMIN SERPL ELPH-MCNC: 2.3 G/DL — LOW (ref 3.3–5)
ALP SERPL-CCNC: 427 U/L — HIGH (ref 40–120)
ALT FLD-CCNC: 752 U/L — HIGH (ref 12–78)
AMYLASE P1 CFR SERPL: 50 U/L — SIGNIFICANT CHANGE UP (ref 25–115)
ANION GAP SERPL CALC-SCNC: 8 MMOL/L — SIGNIFICANT CHANGE UP (ref 5–17)
APPEARANCE UR: CLEAR — SIGNIFICANT CHANGE UP
APTT BLD: 26.4 SEC — LOW (ref 27.5–35.5)
AST SERPL-CCNC: 520 U/L — HIGH (ref 15–37)
BASOPHILS # BLD AUTO: 0.05 K/UL — SIGNIFICANT CHANGE UP (ref 0–0.2)
BASOPHILS NFR BLD AUTO: 0.6 % — SIGNIFICANT CHANGE UP (ref 0–2)
BILIRUB SERPL-MCNC: 0.6 MG/DL — SIGNIFICANT CHANGE UP (ref 0.2–1.2)
BILIRUB UR-MCNC: NEGATIVE — SIGNIFICANT CHANGE UP
BUN SERPL-MCNC: 9 MG/DL — SIGNIFICANT CHANGE UP (ref 7–23)
CALCIUM SERPL-MCNC: 8.9 MG/DL — SIGNIFICANT CHANGE UP (ref 8.5–10.1)
CHLORIDE SERPL-SCNC: 110 MMOL/L — HIGH (ref 96–108)
CO2 SERPL-SCNC: 21 MMOL/L — LOW (ref 22–31)
COLOR SPEC: YELLOW — SIGNIFICANT CHANGE UP
CREAT ?TM UR-MCNC: 58 MG/DL — SIGNIFICANT CHANGE UP
CREAT SERPL-MCNC: 0.53 MG/DL — SIGNIFICANT CHANGE UP (ref 0.5–1.3)
DIFF PNL FLD: ABNORMAL
EGFR: 130 ML/MIN/1.73M2 — SIGNIFICANT CHANGE UP
EOSINOPHIL # BLD AUTO: 0.06 K/UL — SIGNIFICANT CHANGE UP (ref 0–0.5)
EOSINOPHIL NFR BLD AUTO: 0.7 % — SIGNIFICANT CHANGE UP (ref 0–6)
FIBRINOGEN PPP-MCNC: 1072 MG/DL — HIGH (ref 330–520)
GLUCOSE SERPL-MCNC: 83 MG/DL — SIGNIFICANT CHANGE UP (ref 70–99)
GLUCOSE UR QL: NEGATIVE — SIGNIFICANT CHANGE UP
HCT VFR BLD CALC: 36.4 % — SIGNIFICANT CHANGE UP (ref 34.5–45)
HGB BLD-MCNC: 11.9 G/DL — SIGNIFICANT CHANGE UP (ref 11.5–15.5)
IMM GRANULOCYTES NFR BLD AUTO: 0.9 % — SIGNIFICANT CHANGE UP (ref 0–0.9)
INR BLD: 0.87 RATIO — LOW (ref 0.88–1.16)
KETONES UR-MCNC: NEGATIVE — SIGNIFICANT CHANGE UP
LDH SERPL L TO P-CCNC: 273 U/L — HIGH (ref 84–241)
LEUKOCYTE ESTERASE UR-ACNC: NEGATIVE — SIGNIFICANT CHANGE UP
LIDOCAIN IGE QN: 137 U/L — SIGNIFICANT CHANGE UP (ref 73–393)
LYMPHOCYTES # BLD AUTO: 1.88 K/UL — SIGNIFICANT CHANGE UP (ref 1–3.3)
LYMPHOCYTES # BLD AUTO: 23.5 % — SIGNIFICANT CHANGE UP (ref 13–44)
MCHC RBC-ENTMCNC: 26.6 PG — LOW (ref 27–34)
MCHC RBC-ENTMCNC: 32.7 GM/DL — SIGNIFICANT CHANGE UP (ref 32–36)
MCV RBC AUTO: 81.4 FL — SIGNIFICANT CHANGE UP (ref 80–100)
MONOCYTES # BLD AUTO: 0.38 K/UL — SIGNIFICANT CHANGE UP (ref 0–0.9)
MONOCYTES NFR BLD AUTO: 4.7 % — SIGNIFICANT CHANGE UP (ref 2–14)
NEUTROPHILS # BLD AUTO: 5.57 K/UL — SIGNIFICANT CHANGE UP (ref 1.8–7.4)
NEUTROPHILS NFR BLD AUTO: 69.6 % — SIGNIFICANT CHANGE UP (ref 43–77)
NITRITE UR-MCNC: NEGATIVE — SIGNIFICANT CHANGE UP
PH UR: 6.5 — SIGNIFICANT CHANGE UP (ref 5–8)
PLATELET # BLD AUTO: 168 K/UL — SIGNIFICANT CHANGE UP (ref 150–400)
POTASSIUM SERPL-MCNC: 3.7 MMOL/L — SIGNIFICANT CHANGE UP (ref 3.5–5.3)
POTASSIUM SERPL-SCNC: 3.7 MMOL/L — SIGNIFICANT CHANGE UP (ref 3.5–5.3)
PROT ?TM UR-MCNC: 23 MG/DL — HIGH (ref 0–12)
PROT SERPL-MCNC: 7.8 GM/DL — SIGNIFICANT CHANGE UP (ref 6–8.3)
PROT UR-MCNC: 15
PROT/CREAT UR-RTO: 0.4 RATIO — HIGH (ref 0–0.2)
PROTHROM AB SERPL-ACNC: 10.1 SEC — LOW (ref 10.5–13.4)
RBC # BLD: 4.47 M/UL — SIGNIFICANT CHANGE UP (ref 3.8–5.2)
RBC # FLD: 14.3 % — SIGNIFICANT CHANGE UP (ref 10.3–14.5)
SARS-COV-2 RNA SPEC QL NAA+PROBE: SIGNIFICANT CHANGE UP
SODIUM SERPL-SCNC: 139 MMOL/L — SIGNIFICANT CHANGE UP (ref 135–145)
SP GR SPEC: 1.01 — SIGNIFICANT CHANGE UP (ref 1.01–1.02)
URATE SERPL-MCNC: 4.1 MG/DL — SIGNIFICANT CHANGE UP (ref 2.5–7)
UROBILINOGEN FLD QL: NEGATIVE — SIGNIFICANT CHANGE UP
WBC # BLD: 8.01 K/UL — SIGNIFICANT CHANGE UP (ref 3.8–10.5)
WBC # FLD AUTO: 8.01 K/UL — SIGNIFICANT CHANGE UP (ref 3.8–10.5)

## 2023-03-28 PROCEDURE — 87635 SARS-COV-2 COVID-19 AMP PRB: CPT

## 2023-03-28 PROCEDURE — 88307 TISSUE EXAM BY PATHOLOGIST: CPT

## 2023-03-28 PROCEDURE — 76700 US EXAM ABDOM COMPLETE: CPT

## 2023-03-28 PROCEDURE — 86850 RBC ANTIBODY SCREEN: CPT

## 2023-03-28 PROCEDURE — 86780 TREPONEMA PALLIDUM: CPT

## 2023-03-28 PROCEDURE — 99214 OFFICE O/P EST MOD 30 MIN: CPT

## 2023-03-28 PROCEDURE — 76816 OB US FOLLOW-UP PER FETUS: CPT

## 2023-03-28 PROCEDURE — 76857 US EXAM PELVIC LIMITED: CPT

## 2023-03-28 PROCEDURE — 87086 URINE CULTURE/COLONY COUNT: CPT

## 2023-03-28 PROCEDURE — 94760 N-INVAS EAR/PLS OXIMETRY 1: CPT

## 2023-03-28 PROCEDURE — 76700 US EXAM ABDOM COMPLETE: CPT | Mod: 26

## 2023-03-28 PROCEDURE — 86900 BLOOD TYPING SEROLOGIC ABO: CPT

## 2023-03-28 PROCEDURE — 87522 HEPATITIS C REVRS TRNSCRPJ: CPT

## 2023-03-28 PROCEDURE — ZZZZZ: CPT

## 2023-03-28 PROCEDURE — 84156 ASSAY OF PROTEIN URINE: CPT

## 2023-03-28 PROCEDURE — 86769 SARS-COV-2 COVID-19 ANTIBODY: CPT

## 2023-03-28 PROCEDURE — 85730 THROMBOPLASTIN TIME PARTIAL: CPT

## 2023-03-28 PROCEDURE — 86901 BLOOD TYPING SEROLOGIC RH(D): CPT

## 2023-03-28 PROCEDURE — 87340 HEPATITIS B SURFACE AG IA: CPT

## 2023-03-28 PROCEDURE — 76818 FETAL BIOPHYS PROFILE W/NST: CPT

## 2023-03-28 PROCEDURE — 85027 COMPLETE CBC AUTOMATED: CPT

## 2023-03-28 PROCEDURE — 76857 US EXAM PELVIC LIMITED: CPT | Mod: 26

## 2023-03-28 PROCEDURE — 87521 HEPATITIS C PROBE&RVRS TRNSC: CPT

## 2023-03-28 PROCEDURE — 83690 ASSAY OF LIPASE: CPT

## 2023-03-28 PROCEDURE — 81001 URINALYSIS AUTO W/SCOPE: CPT

## 2023-03-28 PROCEDURE — 84550 ASSAY OF BLOOD/URIC ACID: CPT

## 2023-03-28 PROCEDURE — 99204 OFFICE O/P NEW MOD 45 MIN: CPT | Mod: TH

## 2023-03-28 PROCEDURE — 83615 LACTATE (LD) (LDH) ENZYME: CPT

## 2023-03-28 PROCEDURE — 36415 COLL VENOUS BLD VENIPUNCTURE: CPT

## 2023-03-28 PROCEDURE — 85610 PROTHROMBIN TIME: CPT

## 2023-03-28 PROCEDURE — 85384 FIBRINOGEN ACTIVITY: CPT

## 2023-03-28 PROCEDURE — 59050 FETAL MONITOR W/REPORT: CPT

## 2023-03-28 PROCEDURE — 82150 ASSAY OF AMYLASE: CPT

## 2023-03-28 PROCEDURE — 85025 COMPLETE CBC W/AUTO DIFF WBC: CPT

## 2023-03-28 PROCEDURE — 80053 COMPREHEN METABOLIC PANEL: CPT

## 2023-03-28 PROCEDURE — C1889: CPT

## 2023-03-28 PROCEDURE — 82570 ASSAY OF URINE CREATININE: CPT

## 2023-03-28 RX ORDER — FERROUS SULFATE 325(65) MG
325 (65 FE) TABLET ORAL
Refills: 0 | Status: ACTIVE | COMMUNITY

## 2023-03-28 RX ORDER — URSODIOL 250 MG/1
300 TABLET, FILM COATED ORAL EVERY 12 HOURS
Refills: 0 | Status: DISCONTINUED | OUTPATIENT
Start: 2023-03-28 | End: 2023-03-30

## 2023-03-28 RX ORDER — VITAMIN C, CALCIUM, IRON, VITAMIN D3, VITAMIN E, VITAMIN B1, VITAMIN B2, VITAMIN B3, VITAMIN B6, FOLIC ACID, IODINE, ZINC, COPPER, DOCUSATE SODIUM, DOCOSAHEXAENOIC ACID (DHA) 27-1-50 MG
KIT ORAL
Refills: 0 | Status: ACTIVE | COMMUNITY

## 2023-03-28 RX ORDER — FOLIC ACID 0.8 MG
1 TABLET ORAL DAILY
Refills: 0 | Status: DISCONTINUED | OUTPATIENT
Start: 2023-03-28 | End: 2023-04-01

## 2023-03-28 RX ORDER — FOLIC ACID 0.8 MG
1 TABLET ORAL DAILY
Refills: 0 | Status: DISCONTINUED | OUTPATIENT
Start: 2023-03-28 | End: 2023-03-28

## 2023-03-28 RX ORDER — URSODIOL 300 MG/1
300 CAPSULE ORAL
Refills: 0 | Status: ACTIVE | COMMUNITY

## 2023-03-28 RX ADMIN — URSODIOL 300 MILLIGRAM(S): 250 TABLET, FILM COATED ORAL at 23:10

## 2023-03-28 RX ADMIN — Medication 12 MILLIGRAM(S): at 21:52

## 2023-03-28 NOTE — PATIENT PROFILE OB - BABY A: ELECT TRANSPOND NUMBER, DELIVERY
For medication: meloxicam (MOBIC) 7.5 MG tablet  (medication not loading on list)    90 Day supply requested? No    Comments: in script bin     Pharmacy loaded below: Yes   [id number]

## 2023-03-28 NOTE — DISCUSSION/SUMMARY
[FreeTextEntry1] : Twice weekly testing until delivery\par \par Fetal kick counts encouraged. \par \par Delivery at 36 weeks

## 2023-03-28 NOTE — CONSULT NOTE ADULT - PROBLEM SELECTOR RECOMMENDATION 3
- Intrahepatic cholestasis in this pregnancy - 3/21 - Bile acids noted to be 120; AST/ALT: 272/422  - Repeat CBC, CMP, coags to be ordered  - Urine P/C ration  - Recommend RUQ U/S to evaluate for any other liver/gall bladder pathology.

## 2023-03-28 NOTE — HISTORY OF PRESENT ILLNESS
[FreeTextEntry1] : Rosamaria presents for evaluation of lab work, generalized pruritis and a hisotry of cholestasis on her prior pregnancy

## 2023-03-28 NOTE — CONSULT NOTE ADULT - PROBLEM SELECTOR RECOMMENDATION 9
- 35.5 weeks GA by LMP (MICHELLE: 23, LMP: 22)   - NST reactive; [place] ctx q10-15; Patient with reassuring tracing for greater than 1 hour. Patient can transition to NST BID for 1 hour.   - continue PNV  - Regular diet   - Neonatology consult recommended due to  gestational age.   - Prenatal labs collected  - GBS positive; will need Ampicillin in labor.

## 2023-03-28 NOTE — CONSULT NOTE ADULT - PROBLEM SELECTOR RECOMMENDATION 4
- Bedside sono showing vertex fetus; anterior placenta  - When delivery indicated, can proceed with IOL for vaginal delivery.

## 2023-03-28 NOTE — DATA REVIEWED
[FreeTextEntry1] : Fetal testing today is reassuring. \par \par Review of labwork shows a markedly elevated Bile acids, as well as elevated LFTs. \par \par Her pruritis has improved slightly with the ursodiol, but continues to be uncomfortable. \par \par She is given the diagnosis of cholestasis, and delivery at 36 weeks is recommended.

## 2023-03-28 NOTE — CONSULT NOTE ADULT - ASSESSMENT
27y  at 35.5 weeks GA by LMP (MICHELLE: 23, LMP: 22) who presents to L&D with back and hip pain; presently not found to be in labor. Evaluation to include pyelonephritis vs UTI vs kidney stone per OB team.   MFM consulted due to known diagnosis of IHCP with worsening AST/ALT in terms of management and delivery time.

## 2023-03-28 NOTE — CONSULT NOTE ADULT - SUBJECTIVE AND OBJECTIVE BOX
Interpretor ID# [id number]    27y  at 35.5 weeks GA by LMP who presents to L&D for hip pain and back pain.  Patient states intermittent ctx/cramping. Patient denies vaginal bleeding and leakage of fluid. She endorses good fetal movement. Denies fevers, chills, nausea, vomiting, chest pain, dizziness and headache. Patient also endorses frequent pruritius all over but especially on hands and feet.     MICHELLE: 23  LMP: 22    Prenatal course is significant for:  - Intrahepatic cholestasis in this pregnancy - 3/21 - Bile acids noted to be 120; AST/ALT: 272/422; Delivery reccomended at 36 weeks as per M note.     POB:  G1 - 2018 -  @ 38 wks; IOL due to choelstasis   PGYN: -fibroids, -ovarian cysts, denies STD hx, denies abnormal PAPs   PMH: Denies  PSH: Denies  SH: Denies EtOH, tobacco and illicit drug use during this pregnancy; feels safe at home   Meds: PNVs; iron, Ursodiol 300 BID  Allergies: NKDA    Sono 3/28/23: Vertex, anterior placenta; EFW 2664g, 40%      Vital Signs:  Vital Signs Last 24 Hrs  T(C): 36.4 (28 Mar 2023 22:35), Max: 36.4 (28 Mar 2023 22:35)  T(F): 97.5 (28 Mar 2023 22:35), Max: 97.5 (28 Mar 2023 22:35)  HR: 89 (28 Mar 2023 22:35) (89 - 89)  BP: 107/62 (28 Mar 2023 22:35) (107/62 - 107/62)  BP(mean): --  RR: 18 (28 Mar 2023 22:35) (18 - 18)  SpO2: 98% (28 Mar 2023 22:35) (98% - 98%)    Parameters below as of 28 Mar 2023 22:35  Patient On (Oxygen Delivery Method): room air    Gen: NAD, well-appearing, AAOx3   CV: RRR  Pulm: CTAB  Abd: Soft, gravid; nontender to palpation; mild tenderness suprapubic   Back: Left CVA tenderness  Ext: non-tender, non-edematous  SSE: physiologic discharge, no vaginal bleeding, no pooling of fluid  SVE:  60/-3     Bedside sono: vertex, anterior placenta   FHT: 150 bpm, moderate variability, +Accels, -deccels   [place]:  q10 - 15          Labs:                          11.9   8.01  )-----------( 168      ( 28 Mar 2023 20:29 )             36.4         139  |  110<H>  |  9   ----------------------------<  83  3.7   |  21<L>  |  0.53    Ca    8.9      28 Mar 2023 20:29    TPro  7.8  /  Alb  2.3<L>  /  TBili  0.6  /  DBili  x   /  AST  520<H>  /  ALT  752<H>  /  AlkPhos  427<H>      PT/INR - ( 28 Mar 2023 20:29 )   PT: 10.1 sec;   INR: 0.87 ratio         PTT - ( 28 Mar 2023 20:29 )  PTT:26.4 sec            MEDICATIONS  (STANDING):  betamethasone Injectable 12 milliGRAM(s) IntraMuscular every 24 hours  folic acid 1 milliGRAM(s) Oral daily  prenatal multivitamin 1 Tablet(s) Oral daily  ursodiol Capsule 300 milliGRAM(s) Oral every 12 hours    MEDICATIONS  (PRN):

## 2023-03-28 NOTE — CONSULT NOTE ADULT - PROBLEM SELECTOR RECOMMENDATION 2
- Intrahepatic cholestasis in this pregnancy - 3/21 - Bile acids noted to be 120; AST/ALT: 272/422  - Delivery recommended at 36 weeks   - Repeat labs to be ordered including CBC, CMP, coags   - Patient to receive Betamethasone 12mg q24 hrs x2 doses for ACS  - Continue Ursodiol 300 BID while on antepartum service

## 2023-03-29 LAB
ALBUMIN SERPL ELPH-MCNC: 2.2 G/DL — LOW (ref 3.3–5)
ALP SERPL-CCNC: 405 U/L — HIGH (ref 40–120)
ALT FLD-CCNC: 783 U/L — HIGH (ref 12–78)
ANION GAP SERPL CALC-SCNC: 7 MMOL/L — SIGNIFICANT CHANGE UP (ref 5–17)
APTT BLD: 26.3 SEC — LOW (ref 27.5–35.5)
AST SERPL-CCNC: 528 U/L — HIGH (ref 15–37)
BASOPHILS # BLD AUTO: 0.02 K/UL — SIGNIFICANT CHANGE UP (ref 0–0.2)
BASOPHILS NFR BLD AUTO: 0.2 % — SIGNIFICANT CHANGE UP (ref 0–2)
BILIRUB SERPL-MCNC: 0.6 MG/DL — SIGNIFICANT CHANGE UP (ref 0.2–1.2)
BUN SERPL-MCNC: 13 MG/DL — SIGNIFICANT CHANGE UP (ref 7–23)
CALCIUM SERPL-MCNC: 9.1 MG/DL — SIGNIFICANT CHANGE UP (ref 8.5–10.1)
CHLORIDE SERPL-SCNC: 108 MMOL/L — SIGNIFICANT CHANGE UP (ref 96–108)
CO2 SERPL-SCNC: 21 MMOL/L — LOW (ref 22–31)
COVID-19 SPIKE DOMAIN AB INTERP: POSITIVE
COVID-19 SPIKE DOMAIN ANTIBODY RESULT: >250 U/ML — HIGH
CREAT SERPL-MCNC: 0.51 MG/DL — SIGNIFICANT CHANGE UP (ref 0.5–1.3)
CULTURE RESULTS: SIGNIFICANT CHANGE UP
EGFR: 131 ML/MIN/1.73M2 — SIGNIFICANT CHANGE UP
EOSINOPHIL # BLD AUTO: 0 K/UL — SIGNIFICANT CHANGE UP (ref 0–0.5)
EOSINOPHIL NFR BLD AUTO: 0 % — SIGNIFICANT CHANGE UP (ref 0–6)
FIBRINOGEN PPP-MCNC: 974 MG/DL — HIGH (ref 330–520)
GLUCOSE SERPL-MCNC: 95 MG/DL — SIGNIFICANT CHANGE UP (ref 70–99)
HBV SURFACE AG SERPL QL IA: SIGNIFICANT CHANGE UP
HCT VFR BLD CALC: 33.7 % — LOW (ref 34.5–45)
HGB BLD-MCNC: 11 G/DL — LOW (ref 11.5–15.5)
IMM GRANULOCYTES NFR BLD AUTO: 0.9 % — SIGNIFICANT CHANGE UP (ref 0–0.9)
INR BLD: 0.92 RATIO — SIGNIFICANT CHANGE UP (ref 0.88–1.16)
LYMPHOCYTES # BLD AUTO: 1.38 K/UL — SIGNIFICANT CHANGE UP (ref 1–3.3)
LYMPHOCYTES # BLD AUTO: 17 % — SIGNIFICANT CHANGE UP (ref 13–44)
MCHC RBC-ENTMCNC: 26.8 PG — LOW (ref 27–34)
MCHC RBC-ENTMCNC: 32.6 GM/DL — SIGNIFICANT CHANGE UP (ref 32–36)
MCV RBC AUTO: 82.2 FL — SIGNIFICANT CHANGE UP (ref 80–100)
MONOCYTES # BLD AUTO: 0.16 K/UL — SIGNIFICANT CHANGE UP (ref 0–0.9)
MONOCYTES NFR BLD AUTO: 2 % — SIGNIFICANT CHANGE UP (ref 2–14)
NEUTROPHILS # BLD AUTO: 6.49 K/UL — SIGNIFICANT CHANGE UP (ref 1.8–7.4)
NEUTROPHILS NFR BLD AUTO: 79.9 % — HIGH (ref 43–77)
PLATELET # BLD AUTO: 159 K/UL — SIGNIFICANT CHANGE UP (ref 150–400)
POTASSIUM SERPL-MCNC: 3.9 MMOL/L — SIGNIFICANT CHANGE UP (ref 3.5–5.3)
POTASSIUM SERPL-SCNC: 3.9 MMOL/L — SIGNIFICANT CHANGE UP (ref 3.5–5.3)
PROT SERPL-MCNC: 7.3 GM/DL — SIGNIFICANT CHANGE UP (ref 6–8.3)
PROTHROM AB SERPL-ACNC: 10.7 SEC — SIGNIFICANT CHANGE UP (ref 10.5–13.4)
RBC # BLD: 4.1 M/UL — SIGNIFICANT CHANGE UP (ref 3.8–5.2)
RBC # FLD: 14.4 % — SIGNIFICANT CHANGE UP (ref 10.3–14.5)
SARS-COV-2 IGG+IGM SERPL QL IA: >250 U/ML — HIGH
SARS-COV-2 IGG+IGM SERPL QL IA: POSITIVE
SODIUM SERPL-SCNC: 136 MMOL/L — SIGNIFICANT CHANGE UP (ref 135–145)
SPECIMEN SOURCE: SIGNIFICANT CHANGE UP
T PALLIDUM AB TITR SER: NEGATIVE — SIGNIFICANT CHANGE UP
WBC # BLD: 8.12 K/UL — SIGNIFICANT CHANGE UP (ref 3.8–10.5)
WBC # FLD AUTO: 8.12 K/UL — SIGNIFICANT CHANGE UP (ref 3.8–10.5)

## 2023-03-29 RX ADMIN — URSODIOL 300 MILLIGRAM(S): 250 TABLET, FILM COATED ORAL at 21:51

## 2023-03-29 RX ADMIN — Medication 1 MILLIGRAM(S): at 12:00

## 2023-03-29 RX ADMIN — Medication 1 TABLET(S): at 12:00

## 2023-03-29 RX ADMIN — Medication 12 MILLIGRAM(S): at 21:51

## 2023-03-29 RX ADMIN — URSODIOL 300 MILLIGRAM(S): 250 TABLET, FILM COATED ORAL at 12:00

## 2023-03-29 NOTE — PROGRESS NOTE ADULT - PROBLEM SELECTOR PLAN 3
- RUQ showing cholelithiasis without evidence of cholecystitis or biliary duct dilatation  - P:C 0.4, however preeclampsia unlikely given consistently normal BPs and no PEC symptoms. Will c/w BP monitoring.  - Hep panel pending

## 2023-03-29 NOTE — PROGRESS NOTE ADULT - PROBLEM SELECTOR PLAN 1
- 35.6 weeks GA by LMP (MICHELLE: 4/27/23)  - cephalic, 1/60/-3 @ 2100 on 3/28  - NSTs consistently reactive. Will c/w NST BID until IOL.  - c/w PNV, regular diet  - GBS pos, for amp during labor  - For NICU consult once in labor

## 2023-03-29 NOTE — PROGRESS NOTE ADULT - PROBLEM SELECTOR PLAN 2
- 3/21 - Bile acids 120; AST/ALT: 272/422  - Delivery recommended at 36 weeks   - CBC and coags stable and wnl  - AST//752 > 528/783, stable  - Remainder of CMP stable and wnl   - Continue Ursodiol 300 BID while on antepartum service.  - Continue with daily CBC/CMP/coags for monitoring  - Patient to receive betamethasone #2 @ 2000 with IOL planned for tomorrow AM.

## 2023-03-30 ENCOUNTER — RESULT REVIEW (OUTPATIENT)
Age: 28
End: 2023-03-30

## 2023-03-30 LAB
ALBUMIN SERPL ELPH-MCNC: 2.2 G/DL — LOW (ref 3.3–5)
ALBUMIN SERPL ELPH-MCNC: 2.2 G/DL — LOW (ref 3.3–5)
ALP SERPL-CCNC: 392 U/L — HIGH (ref 40–120)
ALP SERPL-CCNC: 398 U/L — HIGH (ref 40–120)
ALT FLD-CCNC: 1163 U/L — HIGH (ref 12–78)
ALT FLD-CCNC: 936 U/L — HIGH (ref 12–78)
ANION GAP SERPL CALC-SCNC: 10 MMOL/L — SIGNIFICANT CHANGE UP (ref 5–17)
ANION GAP SERPL CALC-SCNC: 8 MMOL/L — SIGNIFICANT CHANGE UP (ref 5–17)
APTT BLD: 24.5 SEC — LOW (ref 27.5–35.5)
APTT BLD: 24.9 SEC — LOW (ref 27.5–35.5)
AST SERPL-CCNC: 660 U/L — HIGH (ref 15–37)
AST SERPL-CCNC: 896 U/L — HIGH (ref 15–37)
BASOPHILS # BLD AUTO: 0.04 K/UL — SIGNIFICANT CHANGE UP (ref 0–0.2)
BASOPHILS NFR BLD AUTO: 0.3 % — SIGNIFICANT CHANGE UP (ref 0–2)
BILIRUB SERPL-MCNC: 0.5 MG/DL — SIGNIFICANT CHANGE UP (ref 0.2–1.2)
BILIRUB SERPL-MCNC: 0.6 MG/DL — SIGNIFICANT CHANGE UP (ref 0.2–1.2)
BUN SERPL-MCNC: 15 MG/DL — SIGNIFICANT CHANGE UP (ref 7–23)
BUN SERPL-MCNC: 15 MG/DL — SIGNIFICANT CHANGE UP (ref 7–23)
CALCIUM SERPL-MCNC: 8.8 MG/DL — SIGNIFICANT CHANGE UP (ref 8.5–10.1)
CALCIUM SERPL-MCNC: 9.3 MG/DL — SIGNIFICANT CHANGE UP (ref 8.5–10.1)
CHLORIDE SERPL-SCNC: 106 MMOL/L — SIGNIFICANT CHANGE UP (ref 96–108)
CHLORIDE SERPL-SCNC: 108 MMOL/L — SIGNIFICANT CHANGE UP (ref 96–108)
CO2 SERPL-SCNC: 20 MMOL/L — LOW (ref 22–31)
CO2 SERPL-SCNC: 21 MMOL/L — LOW (ref 22–31)
CREAT SERPL-MCNC: 0.51 MG/DL — SIGNIFICANT CHANGE UP (ref 0.5–1.3)
CREAT SERPL-MCNC: 0.62 MG/DL — SIGNIFICANT CHANGE UP (ref 0.5–1.3)
EGFR: 125 ML/MIN/1.73M2 — SIGNIFICANT CHANGE UP
EGFR: 131 ML/MIN/1.73M2 — SIGNIFICANT CHANGE UP
EOSINOPHIL # BLD AUTO: 0.01 K/UL — SIGNIFICANT CHANGE UP (ref 0–0.5)
EOSINOPHIL NFR BLD AUTO: 0.1 % — SIGNIFICANT CHANGE UP (ref 0–6)
FIBRINOGEN PPP-MCNC: 1032 MG/DL — HIGH (ref 330–520)
FIBRINOGEN PPP-MCNC: 952 MG/DL — HIGH (ref 330–520)
GLUCOSE SERPL-MCNC: 105 MG/DL — HIGH (ref 70–99)
GLUCOSE SERPL-MCNC: 106 MG/DL — HIGH (ref 70–99)
HCT VFR BLD CALC: 34.1 % — LOW (ref 34.5–45)
HCT VFR BLD CALC: 34.7 % — SIGNIFICANT CHANGE UP (ref 34.5–45)
HCV RNA FLD QL NAA+PROBE: SIGNIFICANT CHANGE UP
HCV RNA SPEC NAA+PROBE-LOG IU: SIGNIFICANT CHANGE UP
HCV RNA SPEC NAA+PROBE-LOG IU: SIGNIFICANT CHANGE UP LOGIU/ML
HCV RNA SPEC QL PROBE+SIG AMP: SIGNIFICANT CHANGE UP
HGB BLD-MCNC: 11 G/DL — LOW (ref 11.5–15.5)
HGB BLD-MCNC: 11.3 G/DL — LOW (ref 11.5–15.5)
IMM GRANULOCYTES NFR BLD AUTO: 1 % — HIGH (ref 0–0.9)
INR BLD: 0.91 RATIO — SIGNIFICANT CHANGE UP (ref 0.88–1.16)
INR BLD: 0.92 RATIO — SIGNIFICANT CHANGE UP (ref 0.88–1.16)
LDH SERPL L TO P-CCNC: 411 U/L — HIGH (ref 84–241)
LYMPHOCYTES # BLD AUTO: 3.73 K/UL — HIGH (ref 1–3.3)
LYMPHOCYTES # BLD AUTO: 32.6 % — SIGNIFICANT CHANGE UP (ref 13–44)
MCHC RBC-ENTMCNC: 26.7 PG — LOW (ref 27–34)
MCHC RBC-ENTMCNC: 26.8 PG — LOW (ref 27–34)
MCHC RBC-ENTMCNC: 32.3 GM/DL — SIGNIFICANT CHANGE UP (ref 32–36)
MCHC RBC-ENTMCNC: 32.6 GM/DL — SIGNIFICANT CHANGE UP (ref 32–36)
MCV RBC AUTO: 82.2 FL — SIGNIFICANT CHANGE UP (ref 80–100)
MCV RBC AUTO: 82.8 FL — SIGNIFICANT CHANGE UP (ref 80–100)
MONOCYTES # BLD AUTO: 0.46 K/UL — SIGNIFICANT CHANGE UP (ref 0–0.9)
MONOCYTES NFR BLD AUTO: 4 % — SIGNIFICANT CHANGE UP (ref 2–14)
NEUTROPHILS # BLD AUTO: 7.08 K/UL — SIGNIFICANT CHANGE UP (ref 1.8–7.4)
NEUTROPHILS NFR BLD AUTO: 62 % — SIGNIFICANT CHANGE UP (ref 43–77)
PLATELET # BLD AUTO: 176 K/UL — SIGNIFICANT CHANGE UP (ref 150–400)
PLATELET # BLD AUTO: 200 K/UL — SIGNIFICANT CHANGE UP (ref 150–400)
POTASSIUM SERPL-MCNC: 3.1 MMOL/L — LOW (ref 3.5–5.3)
POTASSIUM SERPL-MCNC: 3.8 MMOL/L — SIGNIFICANT CHANGE UP (ref 3.5–5.3)
POTASSIUM SERPL-SCNC: 3.1 MMOL/L — LOW (ref 3.5–5.3)
POTASSIUM SERPL-SCNC: 3.8 MMOL/L — SIGNIFICANT CHANGE UP (ref 3.5–5.3)
PROT SERPL-MCNC: 7 GM/DL — SIGNIFICANT CHANGE UP (ref 6–8.3)
PROT SERPL-MCNC: 7.1 GM/DL — SIGNIFICANT CHANGE UP (ref 6–8.3)
PROTHROM AB SERPL-ACNC: 10.6 SEC — SIGNIFICANT CHANGE UP (ref 10.5–13.4)
PROTHROM AB SERPL-ACNC: 10.7 SEC — SIGNIFICANT CHANGE UP (ref 10.5–13.4)
RBC # BLD: 4.12 M/UL — SIGNIFICANT CHANGE UP (ref 3.8–5.2)
RBC # BLD: 4.22 M/UL — SIGNIFICANT CHANGE UP (ref 3.8–5.2)
RBC # FLD: 14.4 % — SIGNIFICANT CHANGE UP (ref 10.3–14.5)
RBC # FLD: 14.4 % — SIGNIFICANT CHANGE UP (ref 10.3–14.5)
SODIUM SERPL-SCNC: 136 MMOL/L — SIGNIFICANT CHANGE UP (ref 135–145)
SODIUM SERPL-SCNC: 137 MMOL/L — SIGNIFICANT CHANGE UP (ref 135–145)
URATE SERPL-MCNC: 4.4 MG/DL — SIGNIFICANT CHANGE UP (ref 2.5–7)
WBC # BLD: 11.43 K/UL — HIGH (ref 3.8–10.5)
WBC # BLD: 8.24 K/UL — SIGNIFICANT CHANGE UP (ref 3.8–10.5)
WBC # FLD AUTO: 11.43 K/UL — HIGH (ref 3.8–10.5)
WBC # FLD AUTO: 8.24 K/UL — SIGNIFICANT CHANGE UP (ref 3.8–10.5)

## 2023-03-30 PROCEDURE — 88307 TISSUE EXAM BY PATHOLOGIST: CPT | Mod: 26

## 2023-03-30 RX ORDER — AER TRAVELER 0.5 G/1
1 SOLUTION RECTAL; TOPICAL EVERY 4 HOURS
Refills: 0 | Status: DISCONTINUED | OUTPATIENT
Start: 2023-03-30 | End: 2023-04-01

## 2023-03-30 RX ORDER — MAGNESIUM HYDROXIDE 400 MG/1
30 TABLET, CHEWABLE ORAL
Refills: 0 | Status: DISCONTINUED | OUTPATIENT
Start: 2023-03-30 | End: 2023-04-01

## 2023-03-30 RX ORDER — CITRIC ACID/SODIUM CITRATE 300-500 MG
30 SOLUTION, ORAL ORAL ONCE
Refills: 0 | Status: DISCONTINUED | OUTPATIENT
Start: 2023-03-30 | End: 2023-03-31

## 2023-03-30 RX ORDER — SODIUM CHLORIDE 9 MG/ML
1000 INJECTION, SOLUTION INTRAVENOUS
Refills: 0 | Status: DISCONTINUED | OUTPATIENT
Start: 2023-03-30 | End: 2023-03-31

## 2023-03-30 RX ORDER — OXYTOCIN 10 UNIT/ML
41.67 VIAL (ML) INJECTION
Qty: 20 | Refills: 0 | Status: DISCONTINUED | OUTPATIENT
Start: 2023-03-30 | End: 2023-04-01

## 2023-03-30 RX ORDER — OXYTOCIN 10 UNIT/ML
333.33 VIAL (ML) INJECTION
Qty: 20 | Refills: 0 | Status: COMPLETED | OUTPATIENT
Start: 2023-03-30 | End: 2023-03-30

## 2023-03-30 RX ORDER — SIMETHICONE 80 MG/1
80 TABLET, CHEWABLE ORAL EVERY 4 HOURS
Refills: 0 | Status: DISCONTINUED | OUTPATIENT
Start: 2023-03-30 | End: 2023-04-01

## 2023-03-30 RX ORDER — BENZOCAINE 10 %
1 GEL (GRAM) MUCOUS MEMBRANE EVERY 6 HOURS
Refills: 0 | Status: DISCONTINUED | OUTPATIENT
Start: 2023-03-30 | End: 2023-04-01

## 2023-03-30 RX ORDER — OXYCODONE HYDROCHLORIDE 5 MG/1
5 TABLET ORAL ONCE
Refills: 0 | Status: DISCONTINUED | OUTPATIENT
Start: 2023-03-30 | End: 2023-04-01

## 2023-03-30 RX ORDER — OXYCODONE HYDROCHLORIDE 5 MG/1
5 TABLET ORAL
Refills: 0 | Status: DISCONTINUED | OUTPATIENT
Start: 2023-03-30 | End: 2023-04-01

## 2023-03-30 RX ORDER — HYDROCORTISONE 1 %
1 OINTMENT (GRAM) TOPICAL EVERY 6 HOURS
Refills: 0 | Status: DISCONTINUED | OUTPATIENT
Start: 2023-03-30 | End: 2023-04-01

## 2023-03-30 RX ORDER — SODIUM CHLORIDE 9 MG/ML
3 INJECTION INTRAMUSCULAR; INTRAVENOUS; SUBCUTANEOUS EVERY 8 HOURS
Refills: 0 | Status: DISCONTINUED | OUTPATIENT
Start: 2023-03-30 | End: 2023-04-01

## 2023-03-30 RX ORDER — TETANUS TOXOID, REDUCED DIPHTHERIA TOXOID AND ACELLULAR PERTUSSIS VACCINE, ADSORBED 5; 2.5; 8; 8; 2.5 [IU]/.5ML; [IU]/.5ML; UG/.5ML; UG/.5ML; UG/.5ML
0.5 SUSPENSION INTRAMUSCULAR ONCE
Refills: 0 | Status: DISCONTINUED | OUTPATIENT
Start: 2023-03-30 | End: 2023-04-01

## 2023-03-30 RX ORDER — IBUPROFEN 200 MG
600 TABLET ORAL EVERY 6 HOURS
Refills: 0 | Status: COMPLETED | OUTPATIENT
Start: 2023-03-30 | End: 2024-02-26

## 2023-03-30 RX ORDER — DIPHENHYDRAMINE HCL 50 MG
25 CAPSULE ORAL EVERY 6 HOURS
Refills: 0 | Status: DISCONTINUED | OUTPATIENT
Start: 2023-03-30 | End: 2023-04-01

## 2023-03-30 RX ORDER — PRAMOXINE HYDROCHLORIDE 150 MG/15G
1 AEROSOL, FOAM RECTAL EVERY 4 HOURS
Refills: 0 | Status: DISCONTINUED | OUTPATIENT
Start: 2023-03-30 | End: 2023-04-01

## 2023-03-30 RX ORDER — DIBUCAINE 1 %
1 OINTMENT (GRAM) RECTAL EVERY 6 HOURS
Refills: 0 | Status: DISCONTINUED | OUTPATIENT
Start: 2023-03-30 | End: 2023-04-01

## 2023-03-30 RX ORDER — ACETAMINOPHEN 500 MG
975 TABLET ORAL
Refills: 0 | Status: DISCONTINUED | OUTPATIENT
Start: 2023-03-30 | End: 2023-04-01

## 2023-03-30 RX ORDER — AMPICILLIN TRIHYDRATE 250 MG
1 CAPSULE ORAL EVERY 4 HOURS
Refills: 0 | Status: DISCONTINUED | OUTPATIENT
Start: 2023-03-30 | End: 2023-03-30

## 2023-03-30 RX ORDER — LANOLIN
1 OINTMENT (GRAM) TOPICAL EVERY 6 HOURS
Refills: 0 | Status: DISCONTINUED | OUTPATIENT
Start: 2023-03-30 | End: 2023-04-01

## 2023-03-30 RX ORDER — CHLORHEXIDINE GLUCONATE 213 G/1000ML
1 SOLUTION TOPICAL ONCE
Refills: 0 | Status: DISCONTINUED | OUTPATIENT
Start: 2023-03-30 | End: 2023-03-31

## 2023-03-30 RX ORDER — OXYTOCIN 10 UNIT/ML
4 VIAL (ML) INJECTION
Qty: 30 | Refills: 0 | Status: DISCONTINUED | OUTPATIENT
Start: 2023-03-30 | End: 2023-04-01

## 2023-03-30 RX ORDER — AMPICILLIN TRIHYDRATE 250 MG
2 CAPSULE ORAL ONCE
Refills: 0 | Status: COMPLETED | OUTPATIENT
Start: 2023-03-30 | End: 2023-03-30

## 2023-03-30 RX ORDER — KETOROLAC TROMETHAMINE 30 MG/ML
30 SYRINGE (ML) INJECTION ONCE
Refills: 0 | Status: DISCONTINUED | OUTPATIENT
Start: 2023-03-30 | End: 2023-04-01

## 2023-03-30 RX ADMIN — Medication 108 GRAM(S): at 18:56

## 2023-03-30 RX ADMIN — URSODIOL 300 MILLIGRAM(S): 250 TABLET, FILM COATED ORAL at 09:42

## 2023-03-30 RX ADMIN — Medication 4 MILLIUNIT(S)/MIN: at 14:48

## 2023-03-30 RX ADMIN — Medication 216 GRAM(S): at 10:45

## 2023-03-30 RX ADMIN — Medication 108 GRAM(S): at 14:48

## 2023-03-30 RX ADMIN — Medication 1 TABLET(S): at 09:41

## 2023-03-30 RX ADMIN — SODIUM CHLORIDE 125 MILLILITER(S): 9 INJECTION, SOLUTION INTRAVENOUS at 10:45

## 2023-03-30 RX ADMIN — URSODIOL 300 MILLIGRAM(S): 250 TABLET, FILM COATED ORAL at 20:53

## 2023-03-30 RX ADMIN — Medication 1 MILLIGRAM(S): at 09:42

## 2023-03-31 LAB
ALBUMIN SERPL ELPH-MCNC: 1.9 G/DL — LOW (ref 3.3–5)
ALP SERPL-CCNC: 318 U/L — HIGH (ref 40–120)
ALT FLD-CCNC: 1054 U/L — HIGH (ref 12–78)
ANION GAP SERPL CALC-SCNC: 5 MMOL/L — SIGNIFICANT CHANGE UP (ref 5–17)
AST SERPL-CCNC: 732 U/L — HIGH (ref 15–37)
BILIRUB SERPL-MCNC: 0.6 MG/DL — SIGNIFICANT CHANGE UP (ref 0.2–1.2)
BUN SERPL-MCNC: 11 MG/DL — SIGNIFICANT CHANGE UP (ref 7–23)
CALCIUM SERPL-MCNC: 8.9 MG/DL — SIGNIFICANT CHANGE UP (ref 8.5–10.1)
CHLORIDE SERPL-SCNC: 106 MMOL/L — SIGNIFICANT CHANGE UP (ref 96–108)
CO2 SERPL-SCNC: 25 MMOL/L — SIGNIFICANT CHANGE UP (ref 22–31)
CREAT SERPL-MCNC: 0.51 MG/DL — SIGNIFICANT CHANGE UP (ref 0.5–1.3)
EGFR: 131 ML/MIN/1.73M2 — SIGNIFICANT CHANGE UP
GLUCOSE SERPL-MCNC: 69 MG/DL — LOW (ref 70–99)
HCT VFR BLD CALC: 30.1 % — LOW (ref 34.5–45)
HGB BLD-MCNC: 10 G/DL — LOW (ref 11.5–15.5)
MCHC RBC-ENTMCNC: 27 PG — SIGNIFICANT CHANGE UP (ref 27–34)
MCHC RBC-ENTMCNC: 33.2 GM/DL — SIGNIFICANT CHANGE UP (ref 32–36)
MCV RBC AUTO: 81.4 FL — SIGNIFICANT CHANGE UP (ref 80–100)
PLATELET # BLD AUTO: 182 K/UL — SIGNIFICANT CHANGE UP (ref 150–400)
POTASSIUM SERPL-MCNC: 4 MMOL/L — SIGNIFICANT CHANGE UP (ref 3.5–5.3)
POTASSIUM SERPL-SCNC: 4 MMOL/L — SIGNIFICANT CHANGE UP (ref 3.5–5.3)
PROT SERPL-MCNC: 6.1 GM/DL — SIGNIFICANT CHANGE UP (ref 6–8.3)
RBC # BLD: 3.7 M/UL — LOW (ref 3.8–5.2)
RBC # FLD: 14.2 % — SIGNIFICANT CHANGE UP (ref 10.3–14.5)
SODIUM SERPL-SCNC: 136 MMOL/L — SIGNIFICANT CHANGE UP (ref 135–145)
WBC # BLD: 12.6 K/UL — HIGH (ref 3.8–10.5)
WBC # FLD AUTO: 12.6 K/UL — HIGH (ref 3.8–10.5)

## 2023-03-31 RX ORDER — IBUPROFEN 200 MG
600 TABLET ORAL EVERY 6 HOURS
Refills: 0 | Status: DISCONTINUED | OUTPATIENT
Start: 2023-03-31 | End: 2023-04-01

## 2023-03-31 RX ADMIN — Medication 975 MILLIGRAM(S): at 21:50

## 2023-03-31 RX ADMIN — SODIUM CHLORIDE 3 MILLILITER(S): 9 INJECTION INTRAMUSCULAR; INTRAVENOUS; SUBCUTANEOUS at 06:20

## 2023-03-31 RX ADMIN — Medication 1000 MILLIUNIT(S)/MIN: at 00:47

## 2023-03-31 RX ADMIN — Medication 975 MILLIGRAM(S): at 20:51

## 2023-03-31 RX ADMIN — Medication 600 MILLIGRAM(S): at 17:42

## 2023-03-31 RX ADMIN — Medication 600 MILLIGRAM(S): at 17:32

## 2023-03-31 RX ADMIN — Medication 600 MILLIGRAM(S): at 12:28

## 2023-03-31 RX ADMIN — Medication 975 MILLIGRAM(S): at 10:50

## 2023-03-31 RX ADMIN — Medication 1 MILLIGRAM(S): at 20:53

## 2023-03-31 RX ADMIN — Medication 975 MILLIGRAM(S): at 09:54

## 2023-03-31 RX ADMIN — Medication 600 MILLIGRAM(S): at 13:30

## 2023-03-31 NOTE — PROGRESS NOTE ADULT - ATTENDING COMMENTS
patient seen at bedside, patient doing well, no complaints, voiding and ambulating with no issues, denies any N/V, RUQ/epigastric pain, patient with LFTS trending down, will repeat labs tomorrow am, to follow up with GI as outpatient.

## 2023-04-01 ENCOUNTER — TRANSCRIPTION ENCOUNTER (OUTPATIENT)
Age: 28
End: 2023-04-01

## 2023-04-01 VITALS
TEMPERATURE: 98 F | OXYGEN SATURATION: 96 % | DIASTOLIC BLOOD PRESSURE: 59 MMHG | HEART RATE: 82 BPM | SYSTOLIC BLOOD PRESSURE: 90 MMHG | RESPIRATION RATE: 17 BRPM

## 2023-04-01 LAB
ALBUMIN SERPL ELPH-MCNC: 1.9 G/DL — LOW (ref 3.3–5)
ALP SERPL-CCNC: 267 U/L — HIGH (ref 40–120)
ALT FLD-CCNC: 803 U/L — HIGH (ref 12–78)
ANION GAP SERPL CALC-SCNC: 5 MMOL/L — SIGNIFICANT CHANGE UP (ref 5–17)
AST SERPL-CCNC: 421 U/L — HIGH (ref 15–37)
BILIRUB SERPL-MCNC: 0.3 MG/DL — SIGNIFICANT CHANGE UP (ref 0.2–1.2)
BUN SERPL-MCNC: 11 MG/DL — SIGNIFICANT CHANGE UP (ref 7–23)
CALCIUM SERPL-MCNC: 8.4 MG/DL — LOW (ref 8.5–10.1)
CHLORIDE SERPL-SCNC: 110 MMOL/L — HIGH (ref 96–108)
CO2 SERPL-SCNC: 24 MMOL/L — SIGNIFICANT CHANGE UP (ref 22–31)
CREAT SERPL-MCNC: 0.8 MG/DL — SIGNIFICANT CHANGE UP (ref 0.5–1.3)
EGFR: 104 ML/MIN/1.73M2 — SIGNIFICANT CHANGE UP
GLUCOSE SERPL-MCNC: 111 MG/DL — HIGH (ref 70–99)
HCT VFR BLD CALC: 28.2 % — LOW (ref 34.5–45)
HGB BLD-MCNC: 9 G/DL — LOW (ref 11.5–15.5)
MCHC RBC-ENTMCNC: 26.5 PG — LOW (ref 27–34)
MCHC RBC-ENTMCNC: 31.9 GM/DL — LOW (ref 32–36)
MCV RBC AUTO: 83.2 FL — SIGNIFICANT CHANGE UP (ref 80–100)
PLATELET # BLD AUTO: 185 K/UL — SIGNIFICANT CHANGE UP (ref 150–400)
POTASSIUM SERPL-MCNC: 3.7 MMOL/L — SIGNIFICANT CHANGE UP (ref 3.5–5.3)
POTASSIUM SERPL-SCNC: 3.7 MMOL/L — SIGNIFICANT CHANGE UP (ref 3.5–5.3)
PROT SERPL-MCNC: 5.7 GM/DL — LOW (ref 6–8.3)
RBC # BLD: 3.39 M/UL — LOW (ref 3.8–5.2)
RBC # FLD: 14.5 % — SIGNIFICANT CHANGE UP (ref 10.3–14.5)
SODIUM SERPL-SCNC: 139 MMOL/L — SIGNIFICANT CHANGE UP (ref 135–145)
WBC # BLD: 15.12 K/UL — HIGH (ref 3.8–10.5)
WBC # FLD AUTO: 15.12 K/UL — HIGH (ref 3.8–10.5)

## 2023-04-01 RX ORDER — IBUPROFEN 200 MG
1 TABLET ORAL
Qty: 0 | Refills: 0 | DISCHARGE
Start: 2023-04-01

## 2023-04-01 RX ADMIN — Medication 1 TABLET(S): at 12:31

## 2023-04-01 RX ADMIN — Medication 600 MILLIGRAM(S): at 00:38

## 2023-04-01 RX ADMIN — Medication 600 MILLIGRAM(S): at 06:52

## 2023-04-01 RX ADMIN — Medication 600 MILLIGRAM(S): at 07:34

## 2023-04-01 RX ADMIN — SIMETHICONE 80 MILLIGRAM(S): 80 TABLET, CHEWABLE ORAL at 12:31

## 2023-04-01 RX ADMIN — Medication 600 MILLIGRAM(S): at 01:25

## 2023-04-01 RX ADMIN — Medication 975 MILLIGRAM(S): at 03:29

## 2023-04-01 RX ADMIN — Medication 975 MILLIGRAM(S): at 02:59

## 2023-04-01 NOTE — DISCHARGE NOTE OB - MEDICATION SUMMARY - MEDICATIONS TO TAKE
I will START or STAY ON the medications listed below when I get home from the hospital:    ibuprofen 600 mg oral tablet  -- 1 tab(s) by mouth every 6 hours  -- Indication: For Pain    Prenatal Multivitamins with Folic Acid 1 mg oral tablet  -- 1 tab(s) by mouth once a day  -- Indication: For Postpartum

## 2023-04-01 NOTE — DISCHARGE NOTE OB - CARE PROVIDER_API CALL
OB provider,   [facility]  [address], [place] [address]  Phone: [phone]  Fax: (   )    -  Established Patient  Follow Up Time: 1 month    GI provider,   Phone: (   )    -  Fax: (   )    -  Follow Up Time: 1 week   OB provider,   [facility]  [address], [place] [address]  Phone: [phone]  Fax: (   )    -  Follow Up Time: 1 month    José Miguel Mcallister)  Gastroenterology; Internal Medicine  [address], [address]  [place] [address]  Phone: [phone]  Fax: [phone]  Follow Up Time: 2 weeks

## 2023-04-01 NOTE — DISCHARGE NOTE OB - PROVIDER TOKENS
FREE:[LAST:[OB provider],PHONE:[phone]],FAX:[(   )    -],ADDRESS:[[address], [place] [address]],FOLLOWUP:[1 month],ESTABLISHEDPATIENT:[T]],FREE:[LAST:[GI provider],PHONE:[(   )    -],FAX:[(   )    -],FOLLOWUP:[1 week]] FREE:[LAST:[OB provider],PHONE:[phone]],FAX:[(   )    -],ADDRESS:[[address], [place] [address]],FOLLOWUP:[1 month]],PROVIDER:[TOKEN:[[id number]],FOLLOWUP:[2 weeks]]

## 2023-04-01 NOTE — DISCHARGE NOTE OB - CARE PLAN
1 Principal Discharge DX:	 (spontaneous vaginal delivery)  Assessment and plan of treatment:	1. Take Motrin 600mg every 6 hours and/or tylenol 650mg every 6 hours as needed for pain.   2. Call your OBGYN to schedule a follow up appointment in 4-6weeks.   3. Call your OBGYN if you experiences severe abdominal pain not improved by oral pain medications, heavy bright red vaginal bleeding saturating more than 1 pad per hour, or fever greater than 100.4F.  4. Place nothing into the vagina for 6 weeks ( no tampons, sex or douching )  5. Expect to feel hot flashes in the first 2 weeks after delivery, especially if your breasts are engorged.  Secondary Diagnosis:	Transaminitis  Assessment and plan of treatment:	Delivery and postpartum course significant for transaminitis. Both AST, ALT trending down after delivery. Hepatitis panel negative. Abdominal US significant for cholelithiasis.     You need to follow up with GI for further management.

## 2023-04-01 NOTE — DISCHARGE NOTE OB - HOSPITAL COURSE
BOSTON SANTAMARIA is a 27y  s/p spontaneous vaginal delivery at 36 weeks gestation secondary to IHCP.     Delivery and postpartum course significant for transaminitis. Both AST, ALT trending down after delivery. Hepatitis panel negative. Abdominal US significant for cholelithiasis. Pt needs to follow up with GI.     Upon discharge she is voiding, tolerating PO, ambulating, lochia is decreasing, labs and vitals were stable, and pain is well controlled.

## 2023-04-01 NOTE — PROGRESS NOTE ADULT - ASSESSMENT
A/P: BOSTON SANTAMARIA is a 27y  now PPD#2 s/p spontaneous vaginal delivery at 36 weeks gestation secondary to IHCP. Postpartum course significant for transaminitis.   1. Postpartum  -Vital signs stable  -Hgb: 10  -Voiding, tolerating PO, bowel function nml   -Advance care as tolerated   -Continue routine postpartum care and education  -Healthy infant     2. Transaminitis   -Asymptomatic   -[id number]>AM labs pending   -Hepatitis panel negative   -Abdominal US significant for cholelithiasis   -For outpatient GI follow up     Dispo: Patient to be discharged when meeting all postpartum milestones and pending attending approval. 
27y  at 35.6 weeks GA by LMP admitted for observation due to elevated LFTS in setting of known diagnosis of IHCP.  HD2

## 2023-04-01 NOTE — DISCHARGE NOTE OB - PLAN OF CARE
1. Take Motrin 600mg every 6 hours and/or tylenol 650mg every 6 hours as needed for pain.   2. Call your OBGYN to schedule a follow up appointment in 4-6weeks.   3. Call your OBGYN if you experiences severe abdominal pain not improved by oral pain medications, heavy bright red vaginal bleeding saturating more than 1 pad per hour, or fever greater than 100.4F.  4. Place nothing into the vagina for 6 weeks ( no tampons, sex or douching )  5. Expect to feel hot flashes in the first 2 weeks after delivery, especially if your breasts are engorged. Delivery and postpartum course significant for transaminitis. Both AST, ALT trending down after delivery. Hepatitis panel negative. Abdominal US significant for cholelithiasis.     You need to follow up with GI for further management.

## 2023-04-01 NOTE — DISCHARGE NOTE OB - PATIENT PORTAL LINK FT
You can access the FollowMyHealth Patient Portal offered by [facility] by registering at the following website: [email or web address]. By joining [facility] FollowMyHealth portal, you will also be able to view your health information using other applications (apps) compatible with our system.

## 2023-04-01 NOTE — DISCHARGE NOTE OB - MATERIALS PROVIDED
Appropriate
Vaccinations/[facility]  Screening Program/  Immunization Record/Breastfeeding Log/Breastfeeding Mother’s Support Group Information/Guide to Postpartum Care/[facility] Hearing Screen Program/Back To Sleep Handout/Shaken Baby Prevention Handout/Breastfeeding Guide and Packet/MMR Vaccination ([facility] Pub Date: 2012)/Tdap Vaccination ([facility] Pub Date: 2012)

## 2023-04-01 NOTE — PROGRESS NOTE ADULT - ATTENDING COMMENTS
patient seen at bedside, doing well no complaints, voiding and ambulating with no issues, denies any N/V, LFTS trending down, h/o cholethiasis/sludge, needs to follow up with GI as outpatient. Patient cleared for discharge at this time, with follow up in 4 weeks with OB.

## 2023-04-01 NOTE — PROGRESS NOTE ADULT - SUBJECTIVE AND OBJECTIVE BOX
BOSTON SANTAMARIA is a 26yo  now PPD#1 s/p spontaneous vaginal delivery at 36 weeks gestation, IOL 2/2 IHCP ; s/p BMZ    Hgh: 119.9 >11>11.3>11 > 10   Platelet 168> 159>176>200 > 182  Cr 0.53>0.51>0.51>0.62 > 0.51  AST/ALT: 520/752>528/783 >660/936> 896/1163 > 732/ 1054    S:    The patient has no complaints.  Pain controlled with current medications.   She is ambulating without difficulty and tolerating PO   + flatus/-BM/+ voiding   She endorses approciate lochia, which is decreasing  Doing both breastfeeding and bottle feeding  She denies fevers, chills, nausea and vomiting.   She denies lightheadedness, dizziness, palpitations, chest pain and SOB.    O:    T(C): 36.6 (23 @ 05:40), Max: 36.8 (23 @ 22:30)  HR: 56 (23 @ 05:40) (52 - 67)  BP: 95/56 (23 @ 05:40) (95/56 - 121/61)  RR: 18 (23 @ 05:40) (18 - 18)  SpO2: 99% (23 @ 05:40) (99% - 99%)    Gen: NAD, AOx3  CV: RRR  Pulm: CTAB  Abdomen:  soft, non-tender, non-distended, +bowel sounds.  Uterus:  Fundus firm below umbilicus  VE:  +lochia  Ext:  Non-tender.                          10.0   12.60 )-----------( 182      ( 31 Mar 2023 07:19 )             30.1         136  |  106  |  11  ----------------------------<  69<L>  4.0   |  25  |  0.51    Ca    8.9      31 Mar 2023 07:19    TPro  6.1  /  Alb  1.9<L>  /  TBili  0.6  /  DBili  x   /  AST  732<H>  /  ALT  1054<H>  /  AlkPhos  318<H>  03      A/P:  Patient is a  26yo  now PPD#1 s/p spontaneous vaginal delivery at 36 weeks gestation  -Vital signs stable  -Postpartum H&H stable  - LFTs trending down as of this morning   -Voiding, tolerating PO, bowel function nml   -Advance care as tolerated   -Continue routine postpartum care and education.  -Healthy female infant   
BOSTON SANTAMARIA is a 27y  now PPD#2 s/p spontaneous vaginal delivery at 36 weeks gestation secondary to IHCP. Postpartum course significant for transaminitis.     S:    The patient has no complaints.  Pain controlled with current treatment regimen.   She is ambulating without difficulty and tolerating PO.   + flatus/-BM/+ voiding   She endorses appropriate lochia, which is decreasing.   She is breastfeeding without difficulty.     O:    T(C): 36.5 (23 @ 20:10), Max: 36.5 (23 @ 08:10)  HR: 69 (23 @ 20:10) (69 - 91)  BP: 109/72 (23 @ 20:10) (93/52 - 109/72)  RR: 18 (23 @ 20:10) (18 - 18)  SpO2: 99% (23 @ 20:10) (99% - 99%)    Gen: NAD, AOx3  CV: RRR  Pulm: CTAB  Breast: Nontender, non-engorged   Abdomen:  Soft, non-tender, non-distended, +bowel sounds  Uterus:  Fundus firm below umbilicus  VE:  +Lochia  Ext:  Non-tender and non-edematous    LABS                          10.0   12.60 )-----------( 182      ( 31 Mar 2023 07:19 )             30.1         136  |  106  |  11  ----------------------------<  69<L>  4.0   |  25  |  0.51    Ca    8.9      31 Mar 2023 07:19    TPro  6.1  /  Alb  1.9<L>  /  TBili  0.6  /  DBili  x   /  AST  732<H>  /  ALT  1054<H>  /  AlkPhos  318<H>        
27y  at 35.6 weeks GA by LMP admitted for observation due to elevated LFTS in setting of known diagnosis of IHCP.  HD2    Denies VB, LOF, CTXs. Reports active FM.  Reports pruritis that has not worsened since yesterday.  Denies HA, SOB, RUQ pain, leg swelling, vision changes.  Denies nausea, vomiting, fevers, chills.    ICU Vital Signs Last 24 Hrs  T(C): 36.2 (29 Mar 2023 12:06), Max: 36.9 (29 Mar 2023 05:30)  T(F): 97.2 (29 Mar 2023 12:06), Max: 98.4 (29 Mar 2023 05:30)  HR: 81 (29 Mar 2023 12:06) (62 - 89)  BP: 92/60 (29 Mar 2023 12:06) (92/60 - 107/62)  RR: 17 (29 Mar 2023 12:06) (17 - 18)  SpO2: 96% (29 Mar 2023 12:06) (96% - 98%)    Gen: NAD, well-appearing, AAOx3   Abd: Soft, gravid; nontender to palpation; mild tenderness suprapubic   Back: Left CVA tenderness  Ext: non-tender, non-edematous  SVE: deferred    FHT: 125bpm, reactive  [place]: irregular contractions     Labs:               11.9   8.01  )-----------( 168      ( 28 Mar 2023 20:29 )             36.4                           11.0   8.12  )-----------( 159      ( 29 Mar 2023 08:36 )             33.7           139  |  110<H>  |  9   ----------------------------<  83  3.7   |  21<L>  |  0.53    Ca    8.9      28 Mar 2023 20:29    TPro  7.8  /  Alb  2.3<L>  /  TBili  0.6  /  DBili  x   /  AST  520<H>  /  ALT  752<H>  /  AlkPhos  427<H>      PT/INR - ( 28 Mar 2023 20:29 )   PT: 10.1 sec;   INR: 0.87 ratio         PTT - ( 28 Mar 2023 20:29 )  PTT:26.4 sec          136  |  108  |  13  ----------------------------<  95  3.9   |  21<L>  |  0.51    Ca    9.1      29 Mar 2023 08:36    TPro  7.3  /  Alb  2.2<L>  /  TBili  0.6  /  DBili  x   /  AST  528<H>  /  ALT  783<H>  /  AlkPhos  405<H>      MEDICATIONS  (STANDING):  betamethasone Injectable 12 milliGRAM(s) IntraMuscular every 24 hours  folic acid 1 milliGRAM(s) Oral daily  prenatal multivitamin 1 Tablet(s) Oral daily  ursodiol Capsule 300 milliGRAM(s) Oral every 12 hours    < from: US Abdomen Complete (23 @ 21:31) >  IMPRESSION:    Cholelithiasis without obvious sonographic evidence of acute   cholecystitis. No hydronephrosis.    --- End of Report ---    < end of copied text >

## 2023-04-05 DIAGNOSIS — Z28.09 IMMUNIZATION NOT CARRIED OUT BECAUSE OF OTHER CONTRAINDICATION: ICD-10-CM

## 2023-04-05 DIAGNOSIS — Z20.822 CONTACT WITH AND (SUSPECTED) EXPOSURE TO COVID-19: ICD-10-CM

## 2023-04-05 DIAGNOSIS — Z3A.35 35 WEEKS GESTATION OF PREGNANCY: ICD-10-CM

## 2023-04-05 DIAGNOSIS — K83.1 OBSTRUCTION OF BILE DUCT: ICD-10-CM

## 2023-04-07 LAB — SURGICAL PATHOLOGY STUDY: SIGNIFICANT CHANGE UP

## 2023-07-05 ENCOUNTER — EMERGENCY (EMERGENCY)
Facility: HOSPITAL | Age: 28
LOS: 0 days | Discharge: HOME | End: 2023-07-05
Attending: STUDENT IN AN ORGANIZED HEALTH CARE EDUCATION/TRAINING PROGRAM
Payer: MEDICAID

## 2023-07-05 VITALS — HEIGHT: 63 IN | WEIGHT: 130.07 LBS

## 2023-07-05 VITALS
DIASTOLIC BLOOD PRESSURE: 57 MMHG | OXYGEN SATURATION: 99 % | HEART RATE: 81 BPM | SYSTOLIC BLOOD PRESSURE: 95 MMHG | RESPIRATION RATE: 16 BRPM

## 2023-07-05 DIAGNOSIS — Z87.19 PERSONAL HISTORY OF OTHER DISEASES OF THE DIGESTIVE SYSTEM: ICD-10-CM

## 2023-07-05 DIAGNOSIS — R10.31 RIGHT LOWER QUADRANT PAIN: ICD-10-CM

## 2023-07-05 DIAGNOSIS — R11.0 NAUSEA: ICD-10-CM

## 2023-07-05 LAB
ALBUMIN SERPL ELPH-MCNC: 3.9 G/DL — SIGNIFICANT CHANGE UP (ref 3.3–5)
ALP SERPL-CCNC: 110 U/L — SIGNIFICANT CHANGE UP (ref 40–120)
ALT FLD-CCNC: 50 U/L — SIGNIFICANT CHANGE UP (ref 12–78)
ANION GAP SERPL CALC-SCNC: 4 MMOL/L — LOW (ref 5–17)
APPEARANCE UR: ABNORMAL
APTT BLD: 32.6 SEC — SIGNIFICANT CHANGE UP (ref 27.5–35.5)
AST SERPL-CCNC: 18 U/L — SIGNIFICANT CHANGE UP (ref 15–37)
BACTERIA # UR AUTO: ABNORMAL
BASOPHILS # BLD AUTO: 0.02 K/UL — SIGNIFICANT CHANGE UP (ref 0–0.2)
BASOPHILS NFR BLD AUTO: 0.2 % — SIGNIFICANT CHANGE UP (ref 0–2)
BILIRUB SERPL-MCNC: 0.3 MG/DL — SIGNIFICANT CHANGE UP (ref 0.2–1.2)
BILIRUB UR-MCNC: NEGATIVE — SIGNIFICANT CHANGE UP
BLD GP AB SCN SERPL QL: SIGNIFICANT CHANGE UP
BUN SERPL-MCNC: 11 MG/DL — SIGNIFICANT CHANGE UP (ref 7–23)
CALCIUM SERPL-MCNC: 8.8 MG/DL — SIGNIFICANT CHANGE UP (ref 8.5–10.1)
CHLORIDE SERPL-SCNC: 108 MMOL/L — SIGNIFICANT CHANGE UP (ref 96–108)
CO2 SERPL-SCNC: 26 MMOL/L — SIGNIFICANT CHANGE UP (ref 22–31)
COLOR SPEC: YELLOW — SIGNIFICANT CHANGE UP
CREAT SERPL-MCNC: 0.48 MG/DL — LOW (ref 0.5–1.3)
DIFF PNL FLD: ABNORMAL
EGFR: 133 ML/MIN/1.73M2 — SIGNIFICANT CHANGE UP
EOSINOPHIL # BLD AUTO: 0.05 K/UL — SIGNIFICANT CHANGE UP (ref 0–0.5)
EOSINOPHIL NFR BLD AUTO: 0.6 % — SIGNIFICANT CHANGE UP (ref 0–6)
EPI CELLS # UR: ABNORMAL
GLUCOSE SERPL-MCNC: 89 MG/DL — SIGNIFICANT CHANGE UP (ref 70–99)
GLUCOSE UR QL: NEGATIVE — SIGNIFICANT CHANGE UP
HCG SERPL-ACNC: <1 MIU/ML — SIGNIFICANT CHANGE UP
HCT VFR BLD CALC: 39.9 % — SIGNIFICANT CHANGE UP (ref 34.5–45)
HGB BLD-MCNC: 13.4 G/DL — SIGNIFICANT CHANGE UP (ref 11.5–15.5)
IMM GRANULOCYTES NFR BLD AUTO: 0.2 % — SIGNIFICANT CHANGE UP (ref 0–0.9)
INR BLD: 1.13 RATIO — SIGNIFICANT CHANGE UP (ref 0.88–1.16)
KETONES UR-MCNC: NEGATIVE — SIGNIFICANT CHANGE UP
LACTATE SERPL-SCNC: 0.8 MMOL/L — SIGNIFICANT CHANGE UP (ref 0.7–2)
LEUKOCYTE ESTERASE UR-ACNC: ABNORMAL
LIDOCAIN IGE QN: 93 U/L — SIGNIFICANT CHANGE UP (ref 73–393)
LYMPHOCYTES # BLD AUTO: 2.26 K/UL — SIGNIFICANT CHANGE UP (ref 1–3.3)
LYMPHOCYTES # BLD AUTO: 24.9 % — SIGNIFICANT CHANGE UP (ref 13–44)
MCHC RBC-ENTMCNC: 27.1 PG — SIGNIFICANT CHANGE UP (ref 27–34)
MCHC RBC-ENTMCNC: 33.6 GM/DL — SIGNIFICANT CHANGE UP (ref 32–36)
MCV RBC AUTO: 80.6 FL — SIGNIFICANT CHANGE UP (ref 80–100)
MONOCYTES # BLD AUTO: 0.47 K/UL — SIGNIFICANT CHANGE UP (ref 0–0.9)
MONOCYTES NFR BLD AUTO: 5.2 % — SIGNIFICANT CHANGE UP (ref 2–14)
NEUTROPHILS # BLD AUTO: 6.25 K/UL — SIGNIFICANT CHANGE UP (ref 1.8–7.4)
NEUTROPHILS NFR BLD AUTO: 68.9 % — SIGNIFICANT CHANGE UP (ref 43–77)
NITRITE UR-MCNC: NEGATIVE — SIGNIFICANT CHANGE UP
PH UR: 6 — SIGNIFICANT CHANGE UP (ref 5–8)
PLATELET # BLD AUTO: 229 K/UL — SIGNIFICANT CHANGE UP (ref 150–400)
POTASSIUM SERPL-MCNC: 3.6 MMOL/L — SIGNIFICANT CHANGE UP (ref 3.5–5.3)
POTASSIUM SERPL-SCNC: 3.6 MMOL/L — SIGNIFICANT CHANGE UP (ref 3.5–5.3)
PROT SERPL-MCNC: 8 GM/DL — SIGNIFICANT CHANGE UP (ref 6–8.3)
PROT UR-MCNC: 30 MG/DL
PROTHROM AB SERPL-ACNC: 13.1 SEC — SIGNIFICANT CHANGE UP (ref 10.5–13.4)
RBC # BLD: 4.95 M/UL — SIGNIFICANT CHANGE UP (ref 3.8–5.2)
RBC # FLD: 14.7 % — HIGH (ref 10.3–14.5)
RBC CASTS # UR COMP ASSIST: >50 /HPF (ref 0–4)
SODIUM SERPL-SCNC: 138 MMOL/L — SIGNIFICANT CHANGE UP (ref 135–145)
SP GR SPEC: 1.01 — SIGNIFICANT CHANGE UP (ref 1.01–1.02)
UROBILINOGEN FLD QL: NEGATIVE — SIGNIFICANT CHANGE UP
WBC # BLD: 9.07 K/UL — SIGNIFICANT CHANGE UP (ref 3.8–10.5)
WBC # FLD AUTO: 9.07 K/UL — SIGNIFICANT CHANGE UP (ref 3.8–10.5)
WBC UR QL: ABNORMAL /HPF (ref 0–5)

## 2023-07-05 PROCEDURE — 96374 THER/PROPH/DIAG INJ IV PUSH: CPT | Mod: XU

## 2023-07-05 PROCEDURE — 96375 TX/PRO/DX INJ NEW DRUG ADDON: CPT

## 2023-07-05 PROCEDURE — 81001 URINALYSIS AUTO W/SCOPE: CPT

## 2023-07-05 PROCEDURE — G1004: CPT

## 2023-07-05 PROCEDURE — 83605 ASSAY OF LACTIC ACID: CPT

## 2023-07-05 PROCEDURE — 85610 PROTHROMBIN TIME: CPT

## 2023-07-05 PROCEDURE — 86900 BLOOD TYPING SEROLOGIC ABO: CPT

## 2023-07-05 PROCEDURE — 74177 CT ABD & PELVIS W/CONTRAST: CPT | Mod: 26,MG

## 2023-07-05 PROCEDURE — 99284 EMERGENCY DEPT VISIT MOD MDM: CPT | Mod: 25

## 2023-07-05 PROCEDURE — 36415 COLL VENOUS BLD VENIPUNCTURE: CPT

## 2023-07-05 PROCEDURE — 86901 BLOOD TYPING SEROLOGIC RH(D): CPT

## 2023-07-05 PROCEDURE — 80053 COMPREHEN METABOLIC PANEL: CPT

## 2023-07-05 PROCEDURE — 85730 THROMBOPLASTIN TIME PARTIAL: CPT

## 2023-07-05 PROCEDURE — 87086 URINE CULTURE/COLONY COUNT: CPT

## 2023-07-05 PROCEDURE — 86850 RBC ANTIBODY SCREEN: CPT

## 2023-07-05 PROCEDURE — 74177 CT ABD & PELVIS W/CONTRAST: CPT | Mod: MG

## 2023-07-05 PROCEDURE — 99285 EMERGENCY DEPT VISIT HI MDM: CPT

## 2023-07-05 PROCEDURE — 83690 ASSAY OF LIPASE: CPT

## 2023-07-05 PROCEDURE — 85025 COMPLETE CBC W/AUTO DIFF WBC: CPT

## 2023-07-05 PROCEDURE — 84702 CHORIONIC GONADOTROPIN TEST: CPT

## 2023-07-05 RX ORDER — ONDANSETRON 8 MG/1
4 TABLET, FILM COATED ORAL ONCE
Refills: 0 | Status: COMPLETED | OUTPATIENT
Start: 2023-07-05 | End: 2023-07-05

## 2023-07-05 RX ORDER — ACETAMINOPHEN 500 MG
1000 TABLET ORAL ONCE
Refills: 0 | Status: COMPLETED | OUTPATIENT
Start: 2023-07-05 | End: 2023-07-05

## 2023-07-05 RX ORDER — SODIUM CHLORIDE 9 MG/ML
1000 INJECTION INTRAMUSCULAR; INTRAVENOUS; SUBCUTANEOUS ONCE
Refills: 0 | Status: COMPLETED | OUTPATIENT
Start: 2023-07-05 | End: 2023-07-05

## 2023-07-05 RX ADMIN — ONDANSETRON 4 MILLIGRAM(S): 8 TABLET, FILM COATED ORAL at 14:21

## 2023-07-05 RX ADMIN — Medication 400 MILLIGRAM(S): at 14:21

## 2023-07-05 RX ADMIN — SODIUM CHLORIDE 1000 MILLILITER(S): 9 INJECTION INTRAMUSCULAR; INTRAVENOUS; SUBCUTANEOUS at 14:21

## 2023-07-05 NOTE — ED STATDOCS - CARE PROVIDER_API CALL
Pavan Moses  Gastroenterology  [address] [place] [address]  Phone: [phone]  Fax: [phone]  Follow Up Time:     Pavan Rand  Surgery  [address] [address]  Phone: [phone]  Fax: [phone]  Follow Up Time:

## 2023-07-05 NOTE — ED STATDOCS - NSFOLLOWUPINSTRUCTIONS_ED_ALL_ED_FT
FOLLOW UP WITH A GASTROENTEROLOGIST AND A SURGEON THIS WEEK. CALL THE OFFICE TO MAKE AN APPOINTMENT. RETURN TO ER FOR ANY WORSENING SYMPTOMS OR NEW CONCERNS.     Acute Abdominal Pain    WHAT YOU NEED TO KNOW:    The cause of your abdominal pain may not be found. If a cause is found, treatment will depend on what the cause is.     DISCHARGE INSTRUCTIONS:    Return to the emergency department if:     You vomit blood or cannot stop vomiting.      You have blood in your bowel movement or it looks like tar.       You have bleeding from your rectum.       Your abdomen is larger than usual, more painful, and hard.       You have severe pain in your abdomen.       You stop passing gas and having bowel movements.       You feel weak, dizzy, or faint.    Contact your healthcare provider if:     You have a fever.      You have new signs and symptoms.      Your symptoms do not get better with treatment.       You have questions or concerns about your condition or care.    Medicines may be given to decrease pain, treat an infection, and manage your symptoms. Take your medicine as directed. Call your healthcare provider if you think your medicine is not helping or if you have side effects. Tell him if you are allergic to any medicine. Keep a list of the medicines, vitamins, and herbs you take. Include the amounts, and when and why you take them. Bring the list or the pill bottles to follow-up visits. Carry your medicine list with you in case of an emergency.    Manage your symptoms:     Apply heat on your abdomen for 20 to 30 minutes every 2 hours for as many days as directed. Heat helps decrease pain and muscle spasms.       Manage your stress. Stress may cause abdominal pain. Your healthcare provider may recommend relaxation techniques and deep breathing exercises to help decrease your stress. Your healthcare provider may recommend you talk to someone about your stress or anxiety, such as a counselor or a trusted friend. Get plenty of sleep and exercise regularly.       Limit or do not drink alcohol. Alcohol can make your abdominal pain worse. Ask your healthcare provider if it is safe for you to drink alcohol. Also ask how much is safe for you to drink.       Do not smoke. Nicotine and other chemicals in cigarettes can damage your esophagus and stomach. Ask your healthcare provider for information if you currently smoke and need help to quit. E-cigarettes or smokeless tobacco still contain nicotine. Talk to your healthcare provider before you use these products.     Make changes to the food you eat as directed: Do not eat foods that cause abdominal pain or other symptoms. Eat small meals more often.     Eat more high-fiber foods if you are constipated. High-fiber foods include fruits, vegetables, whole-grain foods, and legumes.       Do not eat foods that cause gas if you have bloating. Examples include broccoli, cabbage, and cauliflower. Do not drink soda or carbonated drinks, because these may also cause gas.       Do not eat foods or drinks that contain sorbitol or fructose if you have diarrhea and bloating. Some examples are fruit juices, candy, jelly, and sugar-free gum.       Do not eat high-fat foods, such as fried foods, cheeseburgers, hot dogs, and desserts.      Limit or do not drink caffeine. Caffeine may make symptoms, such as heart burn or nausea, worse.       Drink plenty of liquids to prevent dehydration from diarrhea or vomiting. Ask your healthcare provider how much liquid to drink each day and which liquids are best for you.     Follow up with your healthcare provider as directed: Write down your questions so you remember to ask them during your visits.

## 2023-07-05 NOTE — ED STATDOCS - CLINICAL SUMMARY MEDICAL DECISION MAKING FREE TEXT BOX
26 y/o female hx of gall stones currently being worked up for cholecystectomy outpatient presents with RLQ pain. Low suspicion for ovarian torsion given duration of symptoms. Possible appy vs diverticulitis vs UTI.   Will get  CBC, CMP, coags, type and screen, lipase, UA  IVF, antiemetics, IV Tylenol  CT abd pelvis r/o appy  If negative, pt can likely be d/c home and follow up 26 y/o female hx of gall stones currently being worked up for cholecystectomy outpatient presents with RLQ pain. Low suspicion for ovarian torsion given duration of symptoms. Possible appy vs diverticulitis vs UTI.   Will get  CBC, CMP, coags, type and screen, lipase, UA  IVF, antiemetics, IV Tylenol  CT abd pelvis r/o appy  If negative, pt can likely be d/c home and follow up    signed Renetta Laurent PA-C Pt seen and evaluated in intake initially by Dr Simpson.   27F with right sided mid abdominal pain for 1 week, worsening. pt says she was dx with gallstones when she gave birth about 3 mos ago. Has referral from [facility] from yesterday to see outpt surgeons. No significant findings on labwork. CT with nonvisualized tip of appendix but pt with no RLQ TTP no guarding or rebound, no elevated WBC or fever. hematuria due to menses, pt denies urinary symptoms. Denies fever, negative masters's sign. no gallstones seen on CT. recommend pt f/u with GI and sx. return precautions given. Pt feeling well at DC, agrees with DC and plan of care.

## 2023-07-05 NOTE — ED ADULT TRIAGE NOTE - CHIEF COMPLAINT QUOTE
Pt. presents to ED c/o RLQ pain. Pt. reports having pain for the last week. was told to come to ED by PMD. Endorses nausea. Denies vomiting, diarrhea, and fevers

## 2023-07-05 NOTE — ED STATDOCS - NS ED ATTENDING STATEMENT MOD
This was a shared visit with the [place]. I reviewed and verified the documentation and independently performed the documented:

## 2023-07-05 NOTE — ED STATDOCS - PATIENT PORTAL LINK FT
You can access the FollowMyHealth Patient Portal offered by [facility] by registering at the following website: [email or web address]. By joining [employer]’s FollowMyHealth portal, you will also be able to view your health information using other applications (apps) compatible with our system.

## 2023-07-05 NOTE — ED STATDOCS - ATTENDING APP SHARED VISIT CONTRIBUTION OF CARE
I personally saw the patient with the PA, and completed the key components of the history and physical exam. I then discussed the management plan with the PA.  JULIETTE Jennings-MS, MD  Internal/Emergency/Critical Care Medicine

## 2023-07-05 NOTE — ED STATDOCS - OBJECTIVE STATEMENT
28 y/o female 3 months post partum with a PMHx of gall stones presents to the ED c/o RLQ pain and nausea x1 week. Denies hematuria, burning with urination, fevers. Currently on menstrual period. No other complaints at this time. pt is a 28 y/o female 3 months post partum with a PMHx of gall stones presents to the ED c/o RLQ pain and nausea x1 week. Denies hematuria, burning with urination, fevers. Currently on menstrual period. No other complaints at this time.

## 2023-07-05 NOTE — ED STATDOCS - PHYSICAL EXAMINATION
PHYSICAL EXAM:  GENERAL: in NAD, Sitting comfortable in bed, in no respiratory distress  HEAD: Atraumatic, no ryan's sign, no periorbital ecchymosis   EYES: PERRL, EOMs intact b/l w/out deficits  ENMT: Moist membranes, no anterior/posterior, or supraclavicular LAD  CHEST/LUNG: CTAB no wheezes/rhonchi/rales  HEART: RRR no murmur/gallops/rubs  ABDOMEN: +BS, soft, ND. RLQ tenderness to deep palpation   EXTREMITIES: No LE edema, +2 radial pulses b/l  NERVOUS SYSTEM:  A&Ox4, No motor deficits or sensory deficits to b/l UEs  Heme/LYMPH: No ecchymosis or bruising or LAD  SKIN:  No new rashes or DTIs

## 2023-07-05 NOTE — ED STATDOCS - NS_ ATTENDINGSCRIBEDETAILS _ED_A_ED_FT
The scribe's documentation has been prepared under my direction and personally reviewed by me in its entirety. I confirm that the note above accurately reflects all work, treatment, procedures, and medical decision making performed by me.  JULIETTE Jennings-MS, MD  Internal/Emergency/Critical Care Medicine

## 2023-07-05 NOTE — ED ADULT NURSE NOTE - NSFALLUNIVINTERV_ED_ALL_ED
Bed/Stretcher in lowest position, wheels locked, appropriate side rails in place/Call bell, personal items and telephone in reach/Instruct patient to call for assistance before getting out of bed/chair/stretcher/Non-slip footwear applied when patient is off stretcher/[place] to call system/Physically safe environment - no spills, clutter or unnecessary equipment/Purposeful proactive rounding/Room/bathroom lighting operational, light cord in reach

## 2023-07-06 LAB
CULTURE RESULTS: SIGNIFICANT CHANGE UP
SPECIMEN SOURCE: SIGNIFICANT CHANGE UP

## 2023-07-12 ENCOUNTER — OUTPATIENT (OUTPATIENT)
Dept: OUTPATIENT SERVICES | Facility: HOSPITAL | Age: 28
LOS: 1 days | End: 2023-07-12
Payer: MEDICAID

## 2023-07-12 ENCOUNTER — APPOINTMENT (OUTPATIENT)
Dept: ULTRASOUND IMAGING | Facility: CLINIC | Age: 28
End: 2023-07-12
Payer: MEDICAID

## 2023-07-12 DIAGNOSIS — R10.11 RIGHT UPPER QUADRANT PAIN: ICD-10-CM

## 2023-07-12 PROBLEM — K80.20 CALCULUS OF GALLBLADDER WITHOUT CHOLECYSTITIS WITHOUT OBSTRUCTION: Chronic | Status: ACTIVE | Noted: 2023-07-07

## 2023-07-12 PROCEDURE — 76705 ECHO EXAM OF ABDOMEN: CPT | Mod: 26

## 2023-07-12 PROCEDURE — 76705 ECHO EXAM OF ABDOMEN: CPT
